# Patient Record
Sex: FEMALE | Race: WHITE | Employment: OTHER | URBAN - METROPOLITAN AREA
[De-identification: names, ages, dates, MRNs, and addresses within clinical notes are randomized per-mention and may not be internally consistent; named-entity substitution may affect disease eponyms.]

---

## 2017-02-11 ENCOUNTER — HOSPITAL ENCOUNTER (OUTPATIENT)
Age: 72
Discharge: HOME | End: 2017-02-11
Payer: MEDICARE

## 2017-02-11 LAB
-: ABNORMAL
ALBUMIN SERPL-MCNC: 4.3 G/DL (ref 3.5–5.2)
ALBUMIN/GLOBULIN RATIO: 1.5 (ref 1–2.5)
ALP BLD-CCNC: 104 U/L (ref 35–104)
ALT SERPL-CCNC: 19 U/L (ref 5–33)
AMORPHOUS: ABNORMAL
ANION GAP SERPL CALCULATED.3IONS-SCNC: 14 MMOL/L (ref 9–17)
AST SERPL-CCNC: 17 U/L
BACTERIA: ABNORMAL
BILIRUB SERPL-MCNC: 0.69 MG/DL (ref 0.3–1.2)
BILIRUBIN URINE: NEGATIVE
BUN BLDV-MCNC: 14 MG/DL (ref 8–23)
BUN/CREAT BLD: ABNORMAL (ref 9–20)
CALCIUM SERPL-MCNC: 9.4 MG/DL (ref 8.6–10.4)
CASTS UA: ABNORMAL /LPF (ref 0–2)
CHLORIDE BLD-SCNC: 101 MMOL/L (ref 98–107)
CO2: 27 MMOL/L (ref 20–31)
COLOR: ABNORMAL
COMMENT UA: ABNORMAL
CREAT SERPL-MCNC: 0.88 MG/DL (ref 0.5–0.9)
CRYSTALS, UA: ABNORMAL /HPF
EPITHELIAL CELLS UA: ABNORMAL /HPF (ref 0–5)
GFR AFRICAN AMERICAN: >60 ML/MIN
GFR NON-AFRICAN AMERICAN: >60 ML/MIN
GFR SERPL CREATININE-BSD FRML MDRD: ABNORMAL ML/MIN/{1.73_M2}
GFR SERPL CREATININE-BSD FRML MDRD: ABNORMAL ML/MIN/{1.73_M2}
GLUCOSE BLD-MCNC: 115 MG/DL (ref 70–99)
GLUCOSE URINE: NEGATIVE
HCT VFR BLD CALC: 45.8 % (ref 36–46)
HEMOGLOBIN: 15.4 G/DL (ref 12–16)
KETONES, URINE: NEGATIVE
LEUKOCYTE ESTERASE, URINE: ABNORMAL
MCH RBC QN AUTO: 30.8 PG (ref 26–34)
MCHC RBC AUTO-ENTMCNC: 33.7 G/DL (ref 31–37)
MCV RBC AUTO: 91.5 FL (ref 80–100)
MUCUS: ABNORMAL
NITRITE, URINE: NEGATIVE
OTHER OBSERVATIONS UA: ABNORMAL
PDW BLD-RTO: 13.9 % (ref 12.5–15.4)
PH UA: 6 (ref 5–8)
PLATELET # BLD: 239 K/UL (ref 140–450)
PMV BLD AUTO: 11.7 FL (ref 6–12)
POTASSIUM SERPL-SCNC: 3.9 MMOL/L (ref 3.7–5.3)
PROTEIN UA: NEGATIVE
RBC # BLD: 5.01 M/UL (ref 4–5.2)
RBC UA: ABNORMAL /HPF (ref 0–2)
RENAL EPITHELIAL, UA: ABNORMAL /HPF
SODIUM BLD-SCNC: 142 MMOL/L (ref 135–144)
SPECIFIC GRAVITY UA: 1.02 (ref 1–1.03)
TOTAL PROTEIN: 7.2 G/DL (ref 6.4–8.3)
TRICHOMONAS: ABNORMAL
TURBIDITY: ABNORMAL
URINE HGB: NEGATIVE
UROBILINOGEN, URINE: NORMAL
VITAMIN D 25-HYDROXY: 45 NG/ML (ref 30–100)
WBC # BLD: 8.5 K/UL (ref 3.5–11)
WBC UA: ABNORMAL /HPF (ref 0–5)
YEAST: ABNORMAL

## 2017-02-11 PROCEDURE — 82306 VITAMIN D 25 HYDROXY: CPT

## 2017-02-11 PROCEDURE — 80053 COMPREHEN METABOLIC PANEL: CPT

## 2017-02-11 PROCEDURE — 81001 URINALYSIS AUTO W/SCOPE: CPT

## 2017-02-11 PROCEDURE — 36415 COLL VENOUS BLD VENIPUNCTURE: CPT

## 2017-02-11 PROCEDURE — 85027 COMPLETE CBC AUTOMATED: CPT

## 2019-09-03 PROBLEM — L72.9 CYST OF SKIN: Status: ACTIVE | Noted: 2019-09-03

## 2024-02-05 ENCOUNTER — TELEPHONE (OUTPATIENT)
Dept: GYNECOLOGIC ONCOLOGY | Age: 79
End: 2024-02-05

## 2024-02-05 ENCOUNTER — OFFICE VISIT (OUTPATIENT)
Dept: GYNECOLOGIC ONCOLOGY | Age: 79
End: 2024-02-05
Payer: MEDICARE

## 2024-02-05 VITALS
WEIGHT: 153 LBS | SYSTOLIC BLOOD PRESSURE: 138 MMHG | OXYGEN SATURATION: 98 % | HEIGHT: 65 IN | HEART RATE: 73 BPM | BODY MASS INDEX: 25.49 KG/M2 | DIASTOLIC BLOOD PRESSURE: 87 MMHG

## 2024-02-05 DIAGNOSIS — R19.00 PELVIC MASS: Primary | ICD-10-CM

## 2024-02-05 DIAGNOSIS — C78.6 PERITONEAL CARCINOMATOSIS (HCC): ICD-10-CM

## 2024-02-05 DIAGNOSIS — R97.8 ELEVATED TUMOR MARKERS: ICD-10-CM

## 2024-02-05 PROCEDURE — G8399 PT W/DXA RESULTS DOCUMENT: HCPCS | Performed by: PHYSICIAN ASSISTANT

## 2024-02-05 PROCEDURE — 99205 OFFICE O/P NEW HI 60 MIN: CPT | Performed by: PHYSICIAN ASSISTANT

## 2024-02-05 PROCEDURE — G8427 DOCREV CUR MEDS BY ELIG CLIN: HCPCS | Performed by: PHYSICIAN ASSISTANT

## 2024-02-05 PROCEDURE — 1090F PRES/ABSN URINE INCON ASSESS: CPT | Performed by: PHYSICIAN ASSISTANT

## 2024-02-05 PROCEDURE — 1123F ACP DISCUSS/DSCN MKR DOCD: CPT | Performed by: PHYSICIAN ASSISTANT

## 2024-02-05 PROCEDURE — 1036F TOBACCO NON-USER: CPT | Performed by: PHYSICIAN ASSISTANT

## 2024-02-05 PROCEDURE — G8484 FLU IMMUNIZE NO ADMIN: HCPCS | Performed by: PHYSICIAN ASSISTANT

## 2024-02-05 PROCEDURE — G8419 CALC BMI OUT NRM PARAM NOF/U: HCPCS | Performed by: PHYSICIAN ASSISTANT

## 2024-02-05 ASSESSMENT — ENCOUNTER SYMPTOMS
EYES NEGATIVE: 1
RESPIRATORY NEGATIVE: 1
GASTROINTESTINAL NEGATIVE: 1

## 2024-02-05 NOTE — TELEPHONE ENCOUNTER
Called and spoke to Susan from IR to let her know a STAT order was placed for this patient. I let her know that images have been pushed to PACS. Susan stated she will give order to doctor for review.

## 2024-02-05 NOTE — PROGRESS NOTES
[facility] Gynecologic Oncology  [address], [address]  [place] [address]      I am seeing Melissa Bo for New Patient at the request of Dr. Luca Hull.    Chief Complaint:  Pelvic masses, elevated     HPI  She is a 78 y.o. female who was referred to [facility] gynecologic oncology for findings of bilateral ovarian masses and elevated .     Patient initially presented to local gynecology provider Mraie Hyatt NP at [facility] with concerns of postmenopausal bleeding.  She reported menopause in 50s she began postmenopausal bleeding with a clear/light pink vaginal discharge in summer 2023.    Pelvic ultrasound was completed which revealed endometrial stripe at 4.38 mm.  Bilateral enlarged ovaries present.  Due to her concerns of postmenopausal bleeding endometrial biopsy was obtained which revealed strips of atrophic endometrium on 1/10/2024.    OVA 1 obtained which revealed an elevated risk for postmenopausal female at 9.6.   II was elevated at 317.6 units.  CA 19-9 and CEA were within normal limits.    Patient was then referred to [facility] gynecologic oncology for further evaluation and workup.    CT abdomen pelvis on 1/26/2024 revealed multiple peritoneal nodules present within the mid abdomen measuring 1 cm.  Mild bilateral hydronephrosis.  A right pelvic mass measuring up to 15.3 x 10.6 x 9.8 cm.  Left ovary enlarged measuring 4.1 x 3.2 cm and pelvic lymphadenopathy present.  There is small amount of perisplenic and perihepatic and pelvic ascites. CT chest was negative for evidence of metastatic disease.    Discussion was held regarding treatment options including surgery versus neoadjuvant chemotherapy if malignancy is found.    Patient is scheduled for IR biopsy for tissue diagnosis on 2/7/2024 however due to insurance changes she has not transferred care to [facility] gynecologic oncology.    Today, the patient presents with her  Chris. She is a

## 2024-02-05 NOTE — PROGRESS NOTES
Review of Systems   Constitutional: Negative.    HENT:  Negative.     Eyes: Negative.    Respiratory: Negative.     Cardiovascular: Negative.    Gastrointestinal: Negative.    Endocrine: Negative.    Genitourinary:  Positive for frequency.    Musculoskeletal: Negative.    Skin: Negative.    Neurological:  Positive for dizziness (sometimes).   Hematological: Negative.

## 2024-02-06 ENCOUNTER — TELEPHONE (OUTPATIENT)
Dept: GYNECOLOGIC ONCOLOGY | Age: 79
End: 2024-02-06

## 2024-02-06 NOTE — TELEPHONE ENCOUNTER
Thank you for the update  We can see patient is scheduled for procedure next week  We will have close follow up on results

## 2024-02-06 NOTE — TELEPHONE ENCOUNTER
Patient called and inquired if she was supposed to call to schedule IR procedure, or if they will call her.  Writer informed patient that IR will call to schedule.  Patient voiced understanding and appreciation.

## 2024-02-12 ENCOUNTER — HOSPITAL ENCOUNTER (OUTPATIENT)
Dept: ULTRASOUND IMAGING | Age: 79
Discharge: HOME | End: 2024-02-14
Payer: MEDICARE

## 2024-02-12 ENCOUNTER — HOSPITAL ENCOUNTER (OUTPATIENT)
Dept: CT IMAGING | Age: 79
Discharge: HOME | End: 2024-02-14
Payer: MEDICARE

## 2024-02-12 VITALS
DIASTOLIC BLOOD PRESSURE: 78 MMHG | BODY MASS INDEX: 25.49 KG/M2 | TEMPERATURE: 97.7 F | RESPIRATION RATE: 16 BRPM | HEIGHT: 65 IN | WEIGHT: 153 LBS | SYSTOLIC BLOOD PRESSURE: 143 MMHG | HEART RATE: 60 BPM | OXYGEN SATURATION: 97 %

## 2024-02-12 VITALS
OXYGEN SATURATION: 97 % | RESPIRATION RATE: 16 BRPM | DIASTOLIC BLOOD PRESSURE: 73 MMHG | HEART RATE: 66 BPM | SYSTOLIC BLOOD PRESSURE: 137 MMHG

## 2024-02-12 DIAGNOSIS — R97.8 ELEVATED TUMOR MARKERS: ICD-10-CM

## 2024-02-12 DIAGNOSIS — C78.6 SECONDARY MALIGNANT NEOPLASM OF RETROPERITONEUM AND PERITONEUM (HCC): ICD-10-CM

## 2024-02-12 DIAGNOSIS — C78.6 PERITONEAL CARCINOMATOSIS (HCC): ICD-10-CM

## 2024-02-12 LAB
INR PPP: 1
PARTIAL THROMBOPLASTIN TIME: 24.2 SEC (ref 23–36.5)
PLATELET # BLD AUTO: 268 K/UL (ref 138–453)
PROTHROMBIN TIME: 12.9 SEC (ref 11.7–14.9)

## 2024-02-12 PROCEDURE — 88305 TISSUE EXAM BY PATHOLOGIST: CPT

## 2024-02-12 PROCEDURE — 85049 AUTOMATED PLATELET COUNT: CPT

## 2024-02-12 PROCEDURE — 2709999900 IR US GUIDED NEEDLE PLACEMENT

## 2024-02-12 PROCEDURE — 88341 IMHCHEM/IMCYTCHM EA ADD ANTB: CPT

## 2024-02-12 PROCEDURE — 7100000040 HC SPAR PHASE II RECOVERY - FIRST 15 MIN

## 2024-02-12 PROCEDURE — 99153 MOD SED SAME PHYS/QHP EA: CPT

## 2024-02-12 PROCEDURE — 88173 CYTOPATH EVAL FNA REPORT: CPT

## 2024-02-12 PROCEDURE — 2580000003 HC RX 258: Performed by: PHYSICIAN ASSISTANT

## 2024-02-12 PROCEDURE — 88172 CYTP DX EVAL FNA 1ST EA SITE: CPT

## 2024-02-12 PROCEDURE — 88342 IMHCHEM/IMCYTCHM 1ST ANTB: CPT

## 2024-02-12 PROCEDURE — 99152 MOD SED SAME PHYS/QHP 5/>YRS: CPT

## 2024-02-12 PROCEDURE — 88177 CYTP FNA EVAL EA ADDL: CPT

## 2024-02-12 PROCEDURE — 10005 FNA BX W/US GDN 1ST LES: CPT

## 2024-02-12 PROCEDURE — 6360000002 HC RX W HCPCS: Performed by: RADIOLOGY

## 2024-02-12 PROCEDURE — 85610 PROTHROMBIN TIME: CPT

## 2024-02-12 PROCEDURE — 7100000041 HC SPAR PHASE II RECOVERY - ADDTL 15 MIN

## 2024-02-12 PROCEDURE — 85730 THROMBOPLASTIN TIME PARTIAL: CPT

## 2024-02-12 RX ORDER — SODIUM CHLORIDE 9 MG/ML
INJECTION, SOLUTION INTRAVENOUS CONTINUOUS
Status: DISCONTINUED | OUTPATIENT
Start: 2024-02-12 | End: 2024-02-15 | Stop reason: HOSPADM

## 2024-02-12 RX ORDER — FENTANYL CITRATE 50 UG/ML
INJECTION, SOLUTION INTRAMUSCULAR; INTRAVENOUS PRN
Status: COMPLETED | OUTPATIENT
Start: 2024-02-12 | End: 2024-02-12

## 2024-02-12 RX ORDER — MIDAZOLAM HYDROCHLORIDE 2 MG/2ML
INJECTION, SOLUTION INTRAMUSCULAR; INTRAVENOUS PRN
Status: COMPLETED | OUTPATIENT
Start: 2024-02-12 | End: 2024-02-12

## 2024-02-12 RX ADMIN — FENTANYL CITRATE 50 MCG: 50 INJECTION, SOLUTION INTRAMUSCULAR; INTRAVENOUS at 15:11

## 2024-02-12 RX ADMIN — FENTANYL CITRATE 50 MCG: 50 INJECTION, SOLUTION INTRAMUSCULAR; INTRAVENOUS at 15:00

## 2024-02-12 RX ADMIN — SODIUM CHLORIDE: 9 INJECTION, SOLUTION INTRAVENOUS at 13:54

## 2024-02-12 RX ADMIN — MIDAZOLAM HYDROCHLORIDE 0.5 MG: 1 INJECTION, SOLUTION INTRAMUSCULAR; INTRAVENOUS at 15:14

## 2024-02-12 NOTE — DISCHARGE INSTRUCTIONS
General Radiology Post Procedure Instructions    ACTIVITY:   _____Do not drive or operate heavy machinery or make legal decisions until morning.   _____Rest quietly for 24 hours   _____No straining, heavy lifting, or strenuous activity for 24 hours   _____Other        DIET:   _____resume previous diet   _____Start with light meal and advance as tolerated.   _____Drink extra fluids today.  No alcoholic Beverages   _____Other       MEDICATION:   _____Resume medications as before   _____No Aspirin or aspirin containing products for 3 days             Ask your doctor about resuming coumadin, Plavix or other blood thinners.   _____Regular strength Tylenol as directed for discomfort.    WOUND CARE:   _____Remove bandage tomorrow   _____May take a shower or bath tomorrow   _____Keep wound clean and dry   _____Other     NOTIFY PHYSICIAN IF:   _____Temperature over 100 degrees   _____Unusual pain, swelling, redness, drainage, odor at puncture site.   _____Persistent shoulder pain   _____Chest pain   _____Difficulty breathing   _____Coughing up blood    _____If you are not passing as much urine as normal   _____other       WHO TO CALL: If you have any questions, concerns or problems in the 24 hours post procedure call [phone] and ask for [employer] on call.   If a serious situation arises call 911 or your local emergency number.    ADDITIONAL INSTRUCTIONS:        BRING THIS INSTRUCTION SHEET WITH YOU IF YOU NEED TO VISIT AN EMERGENCY ROOM OR SEE YOUR DOCTOR WITHIN 24 HOURS

## 2024-02-12 NOTE — H&P
file for drug use.  Marital Status   Occupation , retired, [employer]    Review of Systems:  CONSTITUTIONAL:   negative for fevers, chills, fatigue and malaise    EYES:   negative for double vision, blurred vision and photophobia    HEENT:   negative for tinnitus, epistaxis and sore throat     RESPIRATORY:   negative for cough, shortness of breath, wheezing     CARDIOVASCULAR:   negative for chest pain, palpitations, syncope, edema     GASTROINTESTINAL:   negative for nausea, vomiting     GENITOURINARY/RENAL:   negative for incontinence     MUSCULOSKELETAL:   negative for neck or back pain     NEUROLOGICAL:   Negative for weakness and tingling  negative for headaches and dizziness     PSYCHIATRIC:   negative for anxiety         OBJECTIVE:   VITALS:  height is 1.651 m (5' 5\") and weight is 69.4 kg (153 lb). Her temporal temperature is 97.7 °F (36.5 °C). Her blood pressure is 150/72 (abnormal) and her pulse is 61. Her respiration is 16 and oxygen saturation is 98%.   CONSTITUTIONAL:alert & cooperative, no acute distress.  Pleasant and talkative.  Present with .  SKIN:  Brief inspection of skin, warm and dry, no rashes on exposed areas of skin.  HEAD:  Normocephalic, atraumatic.  EYES: EOMs intact.   EARS:  Hearing loss.  NOSE:  Nares patent.  No rhinorrhea.  MOUTH/THROAT:  benign  NECK:good ROM.  LUNGS: Clear to auscultation bilaterally, no wheezes.  CARDIOVASCULAR: regular rhythm, occasional irregular beat noted.  ABDOMEN: non distended.  EXTREMITIES: no edema bilateral lower extremities.    IMPRESSIONS:   Peritoneal carcinomatosis   has a past medical history of Hearing loss, Hypercholesteremia, Hypertension, Osteoarthritis, PAC (premature atrial contraction), Pelvic mass, PVC's (premature ventricular contractions), and Scoliosis.   PLANS:   biopsy    JOSE WINSLOW PA-C  Electronically signed 2/12/2024 at 2:01 PM

## 2024-02-12 NOTE — PRE SEDATION
Sedation Pre-Procedure Note    Patient Name: Melissa Bo   YOB: 1945  Room/Bed: Room/bed info not found  Medical Record Number: [id number]  Date: 2/12/2024   Time: 2:29 PM       Indication:  pelvic mass and omental nodules    Consent: I have discussed with the patient and/or the patient representative the indication, alternatives, and the possible risks and/or complications of the planned procedure and the anesthesia methods. The patient and/or patient representative appear to understand and agree to proceed.    Vital Signs:   Vitals:    02/12/24 1351   BP: (!) 150/72   Pulse: 61   Resp: 16   Temp: 97.7 °F (36.5 °C)   SpO2: 98%       Past Medical History:   has a past medical history of Hearing loss, Hypercholesteremia, Hypertension, Osteoarthritis, PAC (premature atrial contraction), Pelvic mass, PVC's (premature ventricular contractions), and Scoliosis.    Past Surgical History:   has a past surgical history that includes Total hip arthroplasty.    Medications:   Scheduled Meds:   Continuous Infusions:    sodium chloride 20 mL/hr at 02/12/24 1354     PRN Meds:   Home Meds:   Prior to Admission medications    Medication Sig Start Date End Date Taking? Authorizing Provider   atorvastatin (LIPITOR) 20 MG tablet TAKE 1 TABLET BY MOUTH ONE TIME A DAY 6/3/19   Adrián Cano MD   DILT- MG extended release capsule TAKE 1 CAPSULE BY MOUTH ONE TIME A DAY 8/25/19   Adrián Cano MD   metoprolol succinate (TOPROL XL) 100 MG extended release tablet TAKE 1 TABLET BY MOUTH ONE TIME A DAY 7/29/19   Adrián Cano MD   aspirin 81 MG tablet Take 1 tablet by mouth daily    Adrián Cano MD   calcium-vitamin D (OSCAL-500) 500-200 MG-UNIT per tablet Take 1 tablet by mouth daily    Adrián Cano MD     Coumadin Use Last 7 Days:  no  Antiplatelet drug therapy use last 7 days: no  Other anticoagulant use last 7 days: no  Additional Medication Information:  see med

## 2024-02-12 NOTE — BRIEF OP NOTE
Brief Postoperative Note    Melissa Bo  YOB: 1945  [id number]    Pre-operative Diagnosis: Pelvic masses and peritoneal nodules    Post-operative Diagnosis: Same    Procedure: US guided FNA of left amdominal omental/peritoneal nodule    Anesthesia: Moderate Sedation    Surgeons/Assistants: Don    Estimated Blood Loss: less than 50     Complications: None    Specimens: Was Obtained: 4 FNA samples using 20 G needle    Electronically signed by Jono Ochoa MD on 2/12/2024 at 3:58 PM

## 2024-02-12 NOTE — POST SEDATION
Sedation Post Procedure Note    Patient Name: Melissa Bo   YOB: 1945  Room/Bed: Room/bed info not found  Medical Record Number: [id number]  Date: 2/12/2024   Time: 3:57 PM         Physicians/Assistants: Jono Ochoa MD, MD    Procedure Performed:  US guided FNA of left amdominal omental/peritoneal nodule    Post-Sedation Vital Signs:  Vitals:    02/12/24 1351   BP: (!) 150/72   Pulse: 61   Resp: 16   Temp: 97.7 °F (36.5 °C)   SpO2: 98%      Vital signs were reviewed and were stable after the procedure (see flow sheet for vitals)            Complications: none    Electronically signed by Jono Ochoa MD on 2/12/2024 at 3:57 PM

## 2024-02-12 NOTE — H&P (VIEW-ONLY)
History and Physical    Pt Name: Melissa Bo  MRN: [id number]  YOB: 1945  Date of evaluation: 2/12/2024    SUBJECTIVE:   History of Chief Complaint:    Patient presents preprocedure for peritoneal biopsy.  She says that she has been told that she has lymphadenopathy in the pelvis, says that she is thought to have peritoneal carcinomatosis.  She had been experiencing increased vaginal discharge, sought evaluation with gynecology.  She has had imaging, was diagnosed with the above, has elevated ..  She has been scheduled for biopsy today.  Past Medical History    has a past medical history of Hearing loss, Hypercholesteremia, Hypertension, Osteoarthritis, PAC (premature atrial contraction), Pelvic mass, PVC's (premature ventricular contractions), and Scoliosis.  Patient follows with Dr. Ovalle for her cardiology care.  Past Surgical History   has a past surgical history that includes Total hip arthroplasty.  Endometrial biopsy  Medications  Prior to Admission medications    Medication Sig Start Date End Date Taking? Authorizing Provider   atorvastatin (LIPITOR) 20 MG tablet TAKE 1 TABLET BY MOUTH ONE TIME A DAY 6/3/19   Adrián Cano MD   DILT- MG extended release capsule TAKE 1 CAPSULE BY MOUTH ONE TIME A DAY 8/25/19   Adrián Cano MD   metoprolol succinate (TOPROL XL) 100 MG extended release tablet TAKE 1 TABLET BY MOUTH ONE TIME A DAY 7/29/19   Adrián Cano MD   aspirin 81 MG tablet Take 1 tablet by mouth daily    Adrián Cano MD   calcium-vitamin D (OSCAL-500) 500-200 MG-UNIT per tablet Take 1 tablet by mouth daily    Adrián Cano MD     Allergies  has No Known Allergies.  Family History  family history includes Diabetes type 2  in her father; Peripheral Vascular Disease in her mother.  Social History   reports that she has quit smoking. She has never used smokeless tobacco.   has no history on file for alcohol use.   has no history on  L parietal scalp lac - tripped and fell striking head on corner of baseboard at 11am today. No active bleeding at this time. No LOC or vomiting.

## 2024-02-16 ENCOUNTER — HOSPITAL ENCOUNTER (OUTPATIENT)
Age: 79
Setting detail: SPECIMEN
Discharge: HOME | End: 2024-02-16

## 2024-02-16 ENCOUNTER — OFFICE VISIT (OUTPATIENT)
Dept: GYNECOLOGIC ONCOLOGY | Age: 79
End: 2024-02-16
Payer: MEDICARE

## 2024-02-16 ENCOUNTER — TELEPHONE (OUTPATIENT)
Dept: ONCOLOGY | Age: 79
End: 2024-02-16

## 2024-02-16 ENCOUNTER — TELEPHONE (OUTPATIENT)
Dept: GYNECOLOGIC ONCOLOGY | Age: 79
End: 2024-02-16

## 2024-02-16 VITALS
TEMPERATURE: 97.5 F | BODY MASS INDEX: 25.63 KG/M2 | DIASTOLIC BLOOD PRESSURE: 77 MMHG | WEIGHT: 154 LBS | OXYGEN SATURATION: 98 % | HEART RATE: 78 BPM | SYSTOLIC BLOOD PRESSURE: 137 MMHG

## 2024-02-16 DIAGNOSIS — R97.1 ELEVATED CANCER ANTIGEN 125 (CA 125): ICD-10-CM

## 2024-02-16 DIAGNOSIS — R19.00 PELVIC MASS: ICD-10-CM

## 2024-02-16 DIAGNOSIS — C57.9 MALIGNANT NEOPLASM OF FEMALE GENITAL ORGAN (HCC): ICD-10-CM

## 2024-02-16 DIAGNOSIS — N95.0 POSTMENOPAUSAL BLEEDING: ICD-10-CM

## 2024-02-16 DIAGNOSIS — C78.6 PERITONEAL CARCINOMATOSIS (HCC): Primary | ICD-10-CM

## 2024-02-16 DIAGNOSIS — R10.2 PELVIC PRESSURE IN FEMALE: ICD-10-CM

## 2024-02-16 LAB — CANCER AG125 SERPL-ACNC: 388 U/ML

## 2024-02-16 PROCEDURE — G2212 PROLONG OUTPT/OFFICE VIS: HCPCS | Performed by: OBSTETRICS & GYNECOLOGY

## 2024-02-16 PROCEDURE — 1123F ACP DISCUSS/DSCN MKR DOCD: CPT | Performed by: OBSTETRICS & GYNECOLOGY

## 2024-02-16 PROCEDURE — 99205 OFFICE O/P NEW HI 60 MIN: CPT | Performed by: OBSTETRICS & GYNECOLOGY

## 2024-02-16 PROCEDURE — 3078F DIAST BP <80 MM HG: CPT | Performed by: OBSTETRICS & GYNECOLOGY

## 2024-02-16 PROCEDURE — 3075F SYST BP GE 130 - 139MM HG: CPT | Performed by: OBSTETRICS & GYNECOLOGY

## 2024-02-16 NOTE — PROGRESS NOTES
[facility] Gynecologic Oncology  [address]  [place] [address]    GYNECOLOGIC ONCOLOGY   FOLLOW UP NOTE    PATIENT NAME:  Melissa Bo  MRN:                     [id number]  DATE:                    02/16/2024    REASON FOR VISIT:     New patient visit  Post-menopausal bleeding  Pelvic mass  Elevated -LZ    HISTORY OF PRESENT ILLNESS:     Melissa Bo is an 79yo, with symptoms of pelvic pressure and post-menopausal bleeding. CT-scan AP demonstrates 3cm deep liver lesion, peritoneal carcinomatosis (largest peritoneal nodule 1cm), 15cm pelvic mass, small amount ascites (01/26/24). -NV (01/10/24): 318 (H). Pathology results from the FNA of the omental / peritoneal nodule  is pending at the time of this visit (02/12/24). She presents to the office today, accompanied  (Chris, 89yo) and daughter (Natalia).    The patient reports to feel well. She reports to have symptoms of intermittent vaginal bleeding.     She reports to tolerate a regular diet, with symptoms of anorexia and abdominal bloating. She denies to have any symptoms of nausea. Towards the end of the visit, after the pelvic exam, the patient and her daughter discussed possible symptoms of rectal bleeding, with passing blood clots.         She denies to have any abdominal or pelvic pain symptoms. She denies taking any chronic analgesic medications. She denies to have any symptoms of fatigue. She denies to have any fevers or chills.    TREATMENT SUMMARY:  THESE RECORDS HAVE BEEN REVIEWED, UNLESS OTHERWISE INDICATED     Pelvic mass and Elevated -SO (Diagnosed 2024)  The patient reports to have experienced symptoms of post-menopausal bleeding    DIAGNOSTIC STUDIES:  IMAGING EVALUATION:  Office Pelvic Ultrasound (01/10/24)  7.04x3.23cm uterus, with 0.435cm endometrial thickness  3.43x4.17x3.84cm LEFT ovary  5.52x6.5x5.44cm RIGHT ovary, with 3.5x2.8x3.3cm simple cyst and several solid masses with some

## 2024-02-16 NOTE — PROGRESS NOTES
Review of Systems   Constitutional:  Positive for appetite change (decreased appetite). Negative for chills, diaphoresis, fatigue, fever and unexpected weight change.   HENT:   Negative for hearing loss, lump/mass, mouth sores, nosebleeds, sore throat, tinnitus, trouble swallowing and voice change.    Eyes:  Negative for eye problems and icterus.   Respiratory:  Negative for chest tightness, cough, hemoptysis, shortness of breath and wheezing.    Cardiovascular:  Negative for chest pain, leg swelling and palpitations.   Gastrointestinal:  Positive for abdominal distention (bloating) and blood in stool (bright red blood / hemorrhoids). Negative for abdominal pain, constipation, diarrhea, nausea, rectal pain and vomiting.   Endocrine: Negative for hot flashes.   Genitourinary:  Positive for frequency, vaginal bleeding (last time about a week ago) and vaginal discharge (pale discharge). Negative for bladder incontinence, difficulty urinating, dyspareunia, dysuria, hematuria, menstrual problem, nocturia and pelvic pain.    Musculoskeletal:  Negative for arthralgias, back pain, flank pain, gait problem, myalgias, neck pain and neck stiffness.   Skin:  Negative for itching, rash and wound.   Neurological:  Positive for dizziness (with standing sometimes) and light-headedness (sometimes). Negative for extremity weakness, gait problem, headaches, numbness, seizures and speech difficulty.   Hematological:  Negative for adenopathy. Does not bruise/bleed easily.   Psychiatric/Behavioral:  Positive for depression. Negative for confusion, decreased concentration, sleep disturbance and suicidal ideas. The patient is nervous/anxious.

## 2024-02-16 NOTE — TELEPHONE ENCOUNTER
Called and informed patient that surgeon has surgery on 2/19/24 and appt will need to be rescheduled.  Informed patient that appt will not be scheduled until we have pathology results back.  Assured patient that writer is monitoring case and will ensure appt's required will be made when appropriate.  Patient voiced understanding and appreciation.

## 2024-02-16 NOTE — TELEPHONE ENCOUNTER
Lucille, Dr. Patrick's nurse, called in to alert writer to pt's case & request oncology navigation.  Lucille stated await bx results & may contact pt 2/19.

## 2024-02-19 ENCOUNTER — TELEPHONE (OUTPATIENT)
Dept: ONCOLOGY | Age: 79
End: 2024-02-19

## 2024-02-19 ENCOUNTER — TELEPHONE (OUTPATIENT)
Dept: GYNECOLOGIC ONCOLOGY | Age: 79
End: 2024-02-19

## 2024-02-19 NOTE — TELEPHONE ENCOUNTER
Daughter called and requested clarification on appointments and referrals.  Writer clarified questions.  Daughter voiced understanding and appreciation.

## 2024-02-19 NOTE — TELEPHONE ENCOUNTER
Name: Melissa Bo  : 1945  MRN: [id number]    Oncology Navigation- Initial Note:    Intake-  Contact Type: Telephone    Continuum of Care: Diagnosis/Active Treatment    Smoking hx: Former, quit 40+ years ago.    Notes: Introductory phone call made to patient, instructed patient writer navigating oncology care & may call writer with any questions/concerns @ [phone] prn.  Discussed potential barriers to care, pt denied any.  Inquired on alcohol/drug use, pt stated occasionally drinks wine & denied drug use.  Pt stated awaits bx results & Dr. Patrick f/u to be scheduled once results available.  Pt inquired on holistic care & stated recently found [address].  Notified pt may incorporate holistic tx w/standard of care tx.  Encouraged pt to discuss w/Dr. Patrick.  Pt verbalized understanding & denied questions/concerns.  Instructed pt writer will follow closely & encouraged to contact writer prn.  [facility] written materials along w/writer's business card mailed to patient.  Will continue to follow.     Electronically signed by Kim Rainey RN on 2024 at 10:30 AM

## 2024-02-20 ENCOUNTER — TELEPHONE (OUTPATIENT)
Dept: GYNECOLOGIC ONCOLOGY | Age: 79
End: 2024-02-20

## 2024-02-20 NOTE — TELEPHONE ENCOUNTER
Called pathology to inquire on surgical patho results from IR bx.  Was informed by Marii that patho results were signed out on 2/15/24.  Writer informed Marii that results not available in EPIC.  Writer informed that sometimes if order isn't released correctly that results are visible.  Writer notified PA and supervisor of results / conversation.

## 2024-02-21 ENCOUNTER — TELEPHONE (OUTPATIENT)
Dept: GYNECOLOGIC ONCOLOGY | Age: 79
End: 2024-02-21

## 2024-02-21 ENCOUNTER — TELEPHONE (OUTPATIENT)
Dept: INTERVENTIONAL RADIOLOGY/VASCULAR | Age: 79
End: 2024-02-21

## 2024-02-21 ENCOUNTER — TELEMEDICINE (OUTPATIENT)
Dept: GYNECOLOGIC ONCOLOGY | Age: 79
End: 2024-02-21
Payer: MEDICARE

## 2024-02-21 DIAGNOSIS — R41.3 MEMORY LOSS: ICD-10-CM

## 2024-02-21 DIAGNOSIS — C57.9 MALIGNANT NEOPLASM OF FEMALE GENITAL ORGAN (HCC): Primary | ICD-10-CM

## 2024-02-21 DIAGNOSIS — K62.5 RECTAL BLEEDING: ICD-10-CM

## 2024-02-21 DIAGNOSIS — R19.00 PELVIC MASS: ICD-10-CM

## 2024-02-21 DIAGNOSIS — R97.1 ELEVATED CANCER ANTIGEN 125 (CA 125): ICD-10-CM

## 2024-02-21 DIAGNOSIS — R10.2 PELVIC PRESSURE IN FEMALE: ICD-10-CM

## 2024-02-21 DIAGNOSIS — N95.0 POSTMENOPAUSAL BLEEDING: ICD-10-CM

## 2024-02-21 PROCEDURE — 99215 OFFICE O/P EST HI 40 MIN: CPT | Performed by: OBSTETRICS & GYNECOLOGY

## 2024-02-21 PROCEDURE — 1123F ACP DISCUSS/DSCN MKR DOCD: CPT | Performed by: OBSTETRICS & GYNECOLOGY

## 2024-02-23 ENCOUNTER — TELEPHONE (OUTPATIENT)
Dept: ONCOLOGY | Age: 79
End: 2024-02-23

## 2024-02-23 ENCOUNTER — INITIAL CONSULT (OUTPATIENT)
Dept: ONCOLOGY | Age: 79
End: 2024-02-23
Payer: MEDICARE

## 2024-02-23 VITALS
HEART RATE: 68 BPM | HEIGHT: 65 IN | DIASTOLIC BLOOD PRESSURE: 77 MMHG | BODY MASS INDEX: 25.34 KG/M2 | SYSTOLIC BLOOD PRESSURE: 145 MMHG | WEIGHT: 152.1 LBS | TEMPERATURE: 97.2 F

## 2024-02-23 DIAGNOSIS — C56.1 MALIGNANT NEOPLASM OF RIGHT OVARY (HCC): Primary | ICD-10-CM

## 2024-02-23 DIAGNOSIS — C48.2 CANCER OF PERITONEUM (HCC): ICD-10-CM

## 2024-02-23 PROCEDURE — 99205 OFFICE O/P NEW HI 60 MIN: CPT | Performed by: INTERNAL MEDICINE

## 2024-02-23 PROCEDURE — 1123F ACP DISCUSS/DSCN MKR DOCD: CPT | Performed by: INTERNAL MEDICINE

## 2024-02-23 NOTE — TELEPHONE ENCOUNTER
Name: Melissa Bo  : 1945  MRN: [id number]    Oncology Navigation Follow-Up Note    Contact Type:  Medical Oncology    Notes: Pt @ [facility] for Dr. Brown consultation.  Met w/pt & family prior to appt.  Pt denied questions/concerns for writer.  Encouraged to contact writer prn.  Will continue to follow.      Electronically signed by Kim Rainey RN on 2024 at 10:42 AM

## 2024-02-23 NOTE — TELEPHONE ENCOUNTER
cHemo orders placed  Rv in with c 1 d 1       AVS given to  (JB) to follow precert and coordinate rv    PT was given AVS and appt schedule    Electronically signed by Sarah El on 2/23/2024 at 9:51 AM

## 2024-02-28 ENCOUNTER — HOSPITAL ENCOUNTER (OUTPATIENT)
Dept: INTERVENTIONAL RADIOLOGY/VASCULAR | Age: 79
Discharge: HOME | End: 2024-03-01
Payer: MEDICARE

## 2024-02-28 VITALS
RESPIRATION RATE: 16 BRPM | TEMPERATURE: 97.9 F | SYSTOLIC BLOOD PRESSURE: 114 MMHG | OXYGEN SATURATION: 99 % | HEART RATE: 63 BPM | DIASTOLIC BLOOD PRESSURE: 85 MMHG

## 2024-02-28 DIAGNOSIS — C57.9 MALIGNANT NEOPLASM OF FEMALE GENITAL ORGAN (HCC): ICD-10-CM

## 2024-02-28 PROCEDURE — C1788 PORT, INDWELLING, IMP: HCPCS

## 2024-02-28 PROCEDURE — 76937 US GUIDE VASCULAR ACCESS: CPT

## 2024-02-28 PROCEDURE — 6360000002 HC RX W HCPCS: Performed by: PHYSICIAN ASSISTANT

## 2024-02-28 PROCEDURE — 6360000002 HC RX W HCPCS: Performed by: RADIOLOGY

## 2024-02-28 PROCEDURE — 36561 INSERT TUNNELED CV CATH: CPT

## 2024-02-28 PROCEDURE — 77001 FLUOROGUIDE FOR VEIN DEVICE: CPT

## 2024-02-28 RX ORDER — SODIUM CHLORIDE 9 MG/ML
INJECTION, SOLUTION INTRAVENOUS CONTINUOUS
Status: DISCONTINUED | OUTPATIENT
Start: 2024-02-28 | End: 2024-03-02 | Stop reason: HOSPADM

## 2024-02-28 RX ORDER — ACETAMINOPHEN 325 MG/1
650 TABLET ORAL EVERY 4 HOURS PRN
Status: DISCONTINUED | OUTPATIENT
Start: 2024-02-28 | End: 2024-03-02 | Stop reason: HOSPADM

## 2024-02-28 RX ORDER — HEPARIN SODIUM (PORCINE) LOCK FLUSH IV SOLN 100 UNIT/ML 100 UNIT/ML
SOLUTION INTRAVENOUS PRN
Status: COMPLETED | OUTPATIENT
Start: 2024-02-28 | End: 2024-02-28

## 2024-02-28 RX ADMIN — Medication 1000 MG: at 11:33

## 2024-02-28 RX ADMIN — Medication 1000 MG: at 10:44

## 2024-02-28 RX ADMIN — HEPARIN SODIUM (PORCINE) LOCK FLUSH IV SOLN 100 UNIT/ML 500 UNITS: 100 SOLUTION at 11:47

## 2024-02-28 ASSESSMENT — PAIN SCALES - GENERAL: PAINLEVEL_OUTOF10: 0

## 2024-02-28 ASSESSMENT — PAIN - FUNCTIONAL ASSESSMENT: PAIN_FUNCTIONAL_ASSESSMENT: 0-10

## 2024-02-28 NOTE — INTERVAL H&P NOTE
Pt Name: Melissa Bo  MRN: [id number]  YOB: 1945  Date of evaluation: 2/28/2024    I have reviewed the patient's history and physical examination completed on 2/12/2024    No changes to history or on examination today, unless noted below.   HR continues to be irregular, history of PVC's, follows with cardiology. Otherwise no changes     KAREN Moreno - CNP  2/28/24  10:48 AM

## 2024-02-28 NOTE — BRIEF OP NOTE
Brief Postoperative Note for Port Placement    Melissa Bo  YOB: 1945  [id number]    Pre-operative Diagnosis: Cancer of peritoneum      Post-operative Diagnosis: Same    Procedure: 8 Fr Port Placement    Medication Given: none    Anesthesia: 1 %Lidocaine, 1% Lidocaine w/ Epi    Surgeons/Assistants: MD Manish and CHUCK Carpio    Estimated Blood Loss: Minimal    Complications: none    Specimen Removal: None    8 Fr Port placement into the Right IJ. Port flushed with heparin.  Incision site closed with Vicryl sutures and tissue adhesive. Vital signs were reviewed and were stable after the procedure. Patient tolerated the procedure well.    Electronically signed by CHUCK Carpio PA-C on 2/28/2024 at 12:17 PM

## 2024-02-29 ENCOUNTER — TELEPHONE (OUTPATIENT)
Dept: INFUSION THERAPY | Age: 79
End: 2024-02-29

## 2024-02-29 ENCOUNTER — TELEPHONE (OUTPATIENT)
Dept: ONCOLOGY | Age: 79
End: 2024-02-29

## 2024-02-29 DIAGNOSIS — C48.2 CANCER OF PERITONEUM (HCC): Primary | ICD-10-CM

## 2024-02-29 RX ORDER — DEXAMETHASONE 4 MG/1
20 TABLET ORAL SEE ADMIN INSTRUCTIONS
Qty: 40 TABLET | Refills: 0 | Status: SHIPPED | OUTPATIENT
Start: 2024-02-29 | End: 2024-03-06 | Stop reason: SDUPTHER

## 2024-02-29 NOTE — TELEPHONE ENCOUNTER
Name: Melissa Bo  : 1945  MRN: [id number]    Oncology Navigation Follow-Up Note    Contact Type:  Telephone    Notes: Pt called in stating completed port placement yesterday & inquired on scheduling chemotherapy.  Instructed pt writer will contact [facility] , now & call back w/update.  Deb updated on conversation w/pt.  Deb stated chemotherapy PA approved this am & will contact pt to schedule.  Attempted to contact pt, no answer, VM left updated on conversation w/Deb.  \"Who to Call When\" document mailed to pt.  Will continue to follow.      Electronically signed by Kim Rainey RN on 2024 at 10:24 AM

## 2024-02-29 NOTE — TELEPHONE ENCOUNTER
Chemotherapy orders received:    Ht=65 inches  Tn=933 lbs  BSA=1.77  Chemotherapy doses verified:   Taxol - no dose selected   Carboplatin - no dose selected   Sarah Steven, RN     Routed tx plan to Dr to select dose  Called and spoke to pt about rx for dex, she verbalized agreement

## 2024-03-05 NOTE — TELEPHONE ENCOUNTER
Chemotherapy orders received:     Ht=65 inches  Os=711 lbs  BSA=1.77  Chemotherapy doses verified:   Taxol - no dose selected   Carboplatin - no dose selected   Katia Juarez RN

## 2024-03-06 ENCOUNTER — OFFICE VISIT (OUTPATIENT)
Dept: ONCOLOGY | Age: 79
End: 2024-03-06
Payer: MEDICARE

## 2024-03-06 ENCOUNTER — TELEPHONE (OUTPATIENT)
Dept: ONCOLOGY | Age: 79
End: 2024-03-06

## 2024-03-06 ENCOUNTER — HOSPITAL ENCOUNTER (OUTPATIENT)
Dept: INFUSION THERAPY | Age: 79
Discharge: HOME | End: 2024-03-06
Payer: MEDICARE

## 2024-03-06 VITALS
SYSTOLIC BLOOD PRESSURE: 136 MMHG | DIASTOLIC BLOOD PRESSURE: 79 MMHG | BODY MASS INDEX: 25.28 KG/M2 | OXYGEN SATURATION: 96 % | HEART RATE: 91 BPM | TEMPERATURE: 97.6 F | WEIGHT: 151.9 LBS

## 2024-03-06 DIAGNOSIS — C48.2 CANCER OF PERITONEUM (HCC): Primary | ICD-10-CM

## 2024-03-06 DIAGNOSIS — Z51.11 CHEMOTHERAPY MANAGEMENT, ENCOUNTER FOR: ICD-10-CM

## 2024-03-06 DIAGNOSIS — C56.1 MALIGNANT NEOPLASM OF RIGHT OVARY (HCC): Primary | ICD-10-CM

## 2024-03-06 LAB
ALBUMIN SERPL-MCNC: 4.1 G/DL (ref 3.5–5.2)
ALBUMIN/GLOB SERPL: 1.5 {RATIO} (ref 1–2.5)
ALP SERPL-CCNC: 124 U/L (ref 35–104)
ALT SERPL-CCNC: 18 U/L (ref 5–33)
ANION GAP SERPL CALCULATED.3IONS-SCNC: 12 MMOL/L (ref 9–17)
AST SERPL-CCNC: 20 U/L
BASOPHILS # BLD: 0 K/UL (ref 0–0.2)
BASOPHILS NFR BLD: 1 % (ref 0–2)
BILIRUB SERPL-MCNC: 0.5 MG/DL (ref 0.3–1.2)
BUN SERPL-MCNC: 19 MG/DL (ref 8–23)
CALCIUM SERPL-MCNC: 9.7 MG/DL (ref 8.6–10.4)
CHLORIDE SERPL-SCNC: 106 MMOL/L (ref 98–107)
CO2 SERPL-SCNC: 23 MMOL/L (ref 20–31)
CREAT SERPL-MCNC: 0.9 MG/DL (ref 0.5–0.9)
EOSINOPHIL # BLD: 0 K/UL (ref 0–0.4)
EOSINOPHILS RELATIVE PERCENT: 0 % (ref 1–4)
ERYTHROCYTE [DISTWIDTH] IN BLOOD BY AUTOMATED COUNT: 13.2 % (ref 12.5–15.4)
GFR SERPL CREATININE-BSD FRML MDRD: >60 ML/MIN/1.73M2
GLUCOSE SERPL-MCNC: 169 MG/DL (ref 70–99)
HCT VFR BLD AUTO: 41.1 % (ref 36–46)
HGB BLD-MCNC: 14 G/DL (ref 12–16)
LYMPHOCYTES NFR BLD: 1 K/UL (ref 1–4.8)
LYMPHOCYTES RELATIVE PERCENT: 11 % (ref 24–44)
MCH RBC QN AUTO: 31.4 PG (ref 26–34)
MCHC RBC AUTO-ENTMCNC: 34.1 G/DL (ref 31–37)
MCV RBC AUTO: 92.2 FL (ref 80–100)
MONOCYTES NFR BLD: 0.1 K/UL (ref 0.1–1.2)
MONOCYTES NFR BLD: 1 % (ref 2–11)
NEUTROPHILS NFR BLD: 87 % (ref 36–66)
NEUTS SEG NFR BLD: 7.6 K/UL (ref 1.8–7.7)
PLATELET # BLD AUTO: 229 K/UL (ref 140–450)
PMV BLD AUTO: 10.2 FL (ref 6–12)
POTASSIUM SERPL-SCNC: 4.4 MMOL/L (ref 3.7–5.3)
PROT SERPL-MCNC: 6.8 G/DL (ref 6.4–8.3)
RBC # BLD AUTO: 4.46 M/UL (ref 4–5.2)
SODIUM SERPL-SCNC: 141 MMOL/L (ref 135–144)
WBC OTHER # BLD: 8.7 K/UL (ref 3.5–11)

## 2024-03-06 PROCEDURE — 99211 OFF/OP EST MAY X REQ PHY/QHP: CPT | Performed by: INTERNAL MEDICINE

## 2024-03-06 PROCEDURE — 6360000002 HC RX W HCPCS: Performed by: INTERNAL MEDICINE

## 2024-03-06 PROCEDURE — 96413 CHEMO IV INFUSION 1 HR: CPT

## 2024-03-06 PROCEDURE — 80053 COMPREHEN METABOLIC PANEL: CPT

## 2024-03-06 PROCEDURE — 96367 TX/PROPH/DG ADDL SEQ IV INF: CPT

## 2024-03-06 PROCEDURE — 2500000003 HC RX 250 WO HCPCS: Performed by: INTERNAL MEDICINE

## 2024-03-06 PROCEDURE — 96375 TX/PRO/DX INJ NEW DRUG ADDON: CPT

## 2024-03-06 PROCEDURE — 99213 OFFICE O/P EST LOW 20 MIN: CPT | Performed by: INTERNAL MEDICINE

## 2024-03-06 PROCEDURE — 36415 COLL VENOUS BLD VENIPUNCTURE: CPT

## 2024-03-06 PROCEDURE — 96415 CHEMO IV INFUSION ADDL HR: CPT

## 2024-03-06 PROCEDURE — 1123F ACP DISCUSS/DSCN MKR DOCD: CPT | Performed by: INTERNAL MEDICINE

## 2024-03-06 PROCEDURE — 36591 DRAW BLOOD OFF VENOUS DEVICE: CPT

## 2024-03-06 PROCEDURE — 85025 COMPLETE CBC W/AUTO DIFF WBC: CPT

## 2024-03-06 PROCEDURE — 2580000003 HC RX 258: Performed by: INTERNAL MEDICINE

## 2024-03-06 PROCEDURE — 96417 CHEMO IV INFUS EACH ADDL SEQ: CPT

## 2024-03-06 RX ORDER — SODIUM CHLORIDE 0.9 % (FLUSH) 0.9 %
5-40 SYRINGE (ML) INJECTION PRN
Status: CANCELLED | OUTPATIENT
Start: 2024-03-06

## 2024-03-06 RX ORDER — DIPHENHYDRAMINE HYDROCHLORIDE 50 MG/ML
50 INJECTION INTRAMUSCULAR; INTRAVENOUS ONCE
Status: CANCELLED | OUTPATIENT
Start: 2024-03-06 | End: 2024-03-06

## 2024-03-06 RX ORDER — HEPARIN 100 UNIT/ML
500 SYRINGE INTRAVENOUS PRN
Status: DISCONTINUED | OUTPATIENT
Start: 2024-03-06 | End: 2024-03-07 | Stop reason: HOSPADM

## 2024-03-06 RX ORDER — DEXAMETHASONE SODIUM PHOSPHATE 10 MG/ML
10 INJECTION INTRAMUSCULAR; INTRAVENOUS ONCE
Status: COMPLETED | OUTPATIENT
Start: 2024-03-06 | End: 2024-03-06

## 2024-03-06 RX ORDER — DIPHENHYDRAMINE HYDROCHLORIDE 50 MG/ML
50 INJECTION INTRAMUSCULAR; INTRAVENOUS
Status: CANCELLED | OUTPATIENT
Start: 2024-03-06

## 2024-03-06 RX ORDER — SODIUM CHLORIDE 0.9 % (FLUSH) 0.9 %
5-40 SYRINGE (ML) INJECTION PRN
Status: DISCONTINUED | OUTPATIENT
Start: 2024-03-06 | End: 2024-03-07 | Stop reason: HOSPADM

## 2024-03-06 RX ORDER — EPINEPHRINE 1 MG/ML
0.3 INJECTION, SOLUTION, CONCENTRATE INTRAVENOUS PRN
Status: CANCELLED | OUTPATIENT
Start: 2024-03-06

## 2024-03-06 RX ORDER — HEPARIN SODIUM (PORCINE) LOCK FLUSH IV SOLN 100 UNIT/ML 100 UNIT/ML
500 SOLUTION INTRAVENOUS PRN
Status: CANCELLED | OUTPATIENT
Start: 2024-03-06

## 2024-03-06 RX ORDER — FAMOTIDINE 10 MG/ML
20 INJECTION, SOLUTION INTRAVENOUS ONCE
Status: COMPLETED | OUTPATIENT
Start: 2024-03-06 | End: 2024-03-06

## 2024-03-06 RX ORDER — FAMOTIDINE 10 MG/ML
20 INJECTION, SOLUTION INTRAVENOUS ONCE
Status: CANCELLED | OUTPATIENT
Start: 2024-03-06 | End: 2024-03-06

## 2024-03-06 RX ORDER — PALONOSETRON 0.05 MG/ML
0.25 INJECTION, SOLUTION INTRAVENOUS ONCE
Status: COMPLETED | OUTPATIENT
Start: 2024-03-06 | End: 2024-03-06

## 2024-03-06 RX ORDER — PALONOSETRON 0.05 MG/ML
0.25 INJECTION, SOLUTION INTRAVENOUS ONCE
Status: CANCELLED | OUTPATIENT
Start: 2024-03-06 | End: 2024-03-06

## 2024-03-06 RX ORDER — DEXAMETHASONE 4 MG/1
TABLET ORAL
Qty: 40 TABLET | Refills: 2 | Status: SHIPPED | OUTPATIENT
Start: 2024-03-06

## 2024-03-06 RX ORDER — ONDANSETRON 2 MG/ML
8 INJECTION INTRAMUSCULAR; INTRAVENOUS
Status: CANCELLED | OUTPATIENT
Start: 2024-03-06

## 2024-03-06 RX ORDER — ACETAMINOPHEN 325 MG/1
650 TABLET ORAL
Status: CANCELLED | OUTPATIENT
Start: 2024-03-06

## 2024-03-06 RX ORDER — ALBUTEROL SULFATE 90 UG/1
4 AEROSOL, METERED RESPIRATORY (INHALATION) PRN
Status: CANCELLED | OUTPATIENT
Start: 2024-03-06

## 2024-03-06 RX ORDER — PROCHLORPERAZINE MALEATE 10 MG
10 TABLET ORAL EVERY 6 HOURS PRN
Qty: 120 TABLET | Refills: 3 | Status: SHIPPED | OUTPATIENT
Start: 2024-03-06

## 2024-03-06 RX ORDER — FAMOTIDINE 10 MG/ML
20 INJECTION, SOLUTION INTRAVENOUS
Status: CANCELLED | OUTPATIENT
Start: 2024-03-06

## 2024-03-06 RX ORDER — SODIUM CHLORIDE 9 MG/ML
INJECTION, SOLUTION INTRAVENOUS CONTINUOUS
Status: CANCELLED | OUTPATIENT
Start: 2024-03-06

## 2024-03-06 RX ORDER — SODIUM CHLORIDE 9 MG/ML
5-250 INJECTION, SOLUTION INTRAVENOUS PRN
Status: CANCELLED | OUTPATIENT
Start: 2024-03-06

## 2024-03-06 RX ORDER — SODIUM CHLORIDE 9 MG/ML
5-250 INJECTION, SOLUTION INTRAVENOUS PRN
Status: DISCONTINUED | OUTPATIENT
Start: 2024-03-06 | End: 2024-03-07 | Stop reason: HOSPADM

## 2024-03-06 RX ORDER — ONDANSETRON 8 MG/1
8 TABLET, ORALLY DISINTEGRATING ORAL EVERY 8 HOURS PRN
Qty: 30 TABLET | Refills: 1 | Status: SHIPPED | OUTPATIENT
Start: 2024-03-06

## 2024-03-06 RX ORDER — DIPHENHYDRAMINE HYDROCHLORIDE 50 MG/ML
50 INJECTION INTRAMUSCULAR; INTRAVENOUS ONCE
Status: COMPLETED | OUTPATIENT
Start: 2024-03-06 | End: 2024-03-06

## 2024-03-06 RX ORDER — MEPERIDINE HYDROCHLORIDE 50 MG/ML
12.5 INJECTION INTRAMUSCULAR; INTRAVENOUS; SUBCUTANEOUS PRN
Status: CANCELLED | OUTPATIENT
Start: 2024-03-06

## 2024-03-06 RX ORDER — LIDOCAINE AND PRILOCAINE 25; 25 MG/G; MG/G
CREAM TOPICAL
Qty: 1 EACH | Refills: 3 | Status: SHIPPED | OUTPATIENT
Start: 2024-03-06

## 2024-03-06 RX ADMIN — PALONOSETRON 0.25 MG: 0.05 INJECTION, SOLUTION INTRAVENOUS at 09:15

## 2024-03-06 RX ADMIN — HEPARIN 500 UNITS: 100 SYRINGE at 13:28

## 2024-03-06 RX ADMIN — CARBOPLATIN 370 MG: 450 INJECTION, SOLUTION INTRAVENOUS at 12:46

## 2024-03-06 RX ADMIN — SODIUM CHLORIDE, PRESERVATIVE FREE 10 ML: 5 INJECTION INTRAVENOUS at 13:28

## 2024-03-06 RX ADMIN — SODIUM CHLORIDE 150 MG: 900 INJECTION, SOLUTION INTRAVENOUS at 09:25

## 2024-03-06 RX ADMIN — DIPHENHYDRAMINE HYDROCHLORIDE 50 MG: 50 INJECTION INTRAMUSCULAR; INTRAVENOUS at 09:15

## 2024-03-06 RX ADMIN — DEXAMETHASONE SODIUM PHOSPHATE 10 MG: 10 INJECTION INTRAMUSCULAR; INTRAVENOUS at 09:15

## 2024-03-06 RX ADMIN — PACLITAXEL 312 MG: 6 INJECTION, SOLUTION, CONCENTRATE INTRAVENOUS at 09:57

## 2024-03-06 RX ADMIN — FAMOTIDINE 20 MG: 10 INJECTION, SOLUTION INTRAVENOUS at 09:15

## 2024-03-06 RX ADMIN — SODIUM CHLORIDE 50 ML/HR: 9 INJECTION, SOLUTION INTRAVENOUS at 09:14

## 2024-03-06 NOTE — PROGRESS NOTES
records  Pretreatment  was 388.  Reviewed logistics prognostic data and potential side effects.  Labs adequate for treatment  Patient counseled on use of antiemetics.  Will obtain scans after cycle 3.  Even though patient overall is doing well ECOG performance status 1 she will need close monitoring given her advanced age.  Patient underwent port placement without complications  NGS testing to assess patient for maintenance PARP inhibitor  Patient's conditions were taken into consideration when deciding risk-benefit for therapy.  Patient is at high risk for drug interaction risk of complications.  Given multiple comorbid conditions patient is at high risk for complication from intervention, risk of hospitalization, medical interaction overall life expectancy.  NCCN guidelines were reviewed and discussed with the patient.  The diagnosis and care plan were discussed with the patient in detail. I discussed the natural history of the disease, prognosis, risks and goals of therapy and answered all the patients questions to the best of my ability.  Patient expressed understanding and was in agreement.      KIERSTEN RUELAS MD    This note is created with the assistance of a speech recognition program.  While intending to generate a document that actually reflects the content of the visit, the document can still have some errors including those of syntax and sound a like substitutions which may escape proof reading.  It such instances, actual meaning can be extrapolated by contextual diversion.

## 2024-03-06 NOTE — PROGRESS NOTES
SPIRITUAL HEALTH PROGRESS NOTE: Outpatient Oncology Care at [facility]    Spiritual Assessment: Patient and Spouse were in the treatment cubicle of the infusion clinic. Pt and Spouse smiled and greeted writer. They welcomed and thanked writer for visiting. They shared that this was Pt's first treatment. Spouse acknowledged that the treatment Pt will receive will be \"severe.\" Pt and Spouse voiced hopes to take things \"one day at a time.\" Pt began singing a hymn that gives her strength. Pt expressed gratitude for the support and prayers of their Evangelical community. Spouse was tearful as he voiced his hopes and prayers for Pt. They were receptive to prayer and voiced their prayer requests.     Intervention: Writer provided supportive presence and active listening. Writer inquired about Pt's coping and needs. Writer affirmed Pt's and Spouse's strengths. Writer asked about Pt's sources of support and strength. Writer prayed for and with Pt and Spouse.     Outcome: Patient and Spouse voiced their hopes and beliefs about how Pt will do. Pt and Spouse spoke of their angle and angle community that support them. Pt and Spouse thanked writer for the visit. Spouse offered a blessing for Pt.     Plan: Chaplains will remain available to provide emotional and spiritual support as needed.       03/06/24 1201   Encounter Summary   Service Provided For: Patient and family together   [facility]   Support System Spouse;Children;Family members;Pentecostal/angle community   Last Encounter  03/06/24   Complexity of Encounter Moderate   Begin Time 1145   End Time  1200   Total Time Calculated 15 min   Spiritual/Emotional needs   Type Spiritual Support   Assessment/Intervention/Outcome   Assessment Calm;Coping;Peaceful   Intervention Active listening;Discussed illness injury and it’s impact;Discussed belief system/Confucianism practices/angle;Discussed relationship with God;Discussed meaning/purpose;Explored/Affirmed  feelings, thoughts, concerns;Explored Coping Skills/Resources;Sustaining Presence/Ministry of presence;Prayer (assurance of)/[place]   Outcome Coping;Encouraged;Engaged in conversation;Expressed feelings, needs, and concerns;Expressed Gratitude;Receptive;Peace   Plan and Referrals   Plan/Referrals Continue Support (comment)       Electronically signed by Celia Tenorio, Oncology Outpatient   [facility]  [phone]  3/6/2024  12:03 PM

## 2024-03-06 NOTE — TELEPHONE ENCOUNTER
Name: Melissa Bo  : 1945  MRN: [id number]    Oncology Navigation Follow-Up Note    Contact Type:  Medical Oncology    Notes: [facility] for Dr. Brown f/u & tx.  Met w/pt & pt's spouse prior to f/u.  Pt denied questions/concerns.  Encouraged to contact writer prn.  Will continue to follow.      Electronically signed by Kim Rainey RN on 3/6/2024 at 9:51 AM

## 2024-03-06 NOTE — PROGRESS NOTES
Pt here for C1D1 taxol/carbo   Arrives ambulatory   Denies complaint/concern   Labs drawn per port and reviewed   Pt seen by Dr. Brown, orders to proceed with tx   Tx complete without incident   Pt discharged in stable condition   Returns 3/13 for nurse eval and possible hydration

## 2024-03-06 NOTE — PROGRESS NOTES
Melissa  and her  Chris   were  here for chemotherapy teaching. Spent 60 minutes with them.  Teaching sheets from [facility] and verbal information given on chemotherapy agents, action, administration and side effects.    Chemotherapy medications discussed: taxol carboplatin     Pregnancy warnings reviewed: not indicated     Chemotherapy consent form signed by patient.    Provided new patient binder, Chemotherapy and You booklet, Eating Hints booklet      Port placement: 2/28     Pt has prescription for EMLA cream and was given instructions on use.    Reviewed take home medication: zofran and compazine     Questions answered and support given.    [facility] rehab referral assessment form, distress tool form and PG-SGA form completed by patient.    Needs that were identified during teaching visit: no needs identified, pt was drowsy d/t premeds     Discussed community resources such as [employer], [facility], [facility], [facility], [employer], etc.      Pt will return on 3/29 for C2D1 and f/u with Dr. Brown   on 3/29.     Sarah Steven RN

## 2024-03-06 NOTE — TELEPHONE ENCOUNTER
Tx Today   Labs and possible fluids next week with nurse eval   Rv in 3 weeks on Friday           Tx today as planned    Labs and nurse eval scheduled for 3/13/24 @ 9am with possible hyrdation    Rv scheduled for 3/29/24 @ 8am with tx to follow. Labs (cbc cmp) to be done at visit    An After Visit Summary was printed and given to the patient.    Electronically signed by Wendy Littlejohn on 3/6/2024 at 9:15 AM

## 2024-03-07 ENCOUNTER — CLINICAL DOCUMENTATION (OUTPATIENT)
Dept: ONCOLOGY | Age: 79
End: 2024-03-07

## 2024-03-07 NOTE — PROGRESS NOTES
[facility] Nutrition Screen:    PG-SGA screening form reviewed; not completely filled out but noted pt request to speak with dietitian. RD evaluation to follow.

## 2024-03-09 RX ORDER — OMEPRAZOLE 20 MG/1
20 CAPSULE, DELAYED RELEASE ORAL 2 TIMES DAILY
Qty: 60 CAPSULE | Refills: 1 | Status: SHIPPED | OUTPATIENT
Start: 2024-03-09

## 2024-03-11 DIAGNOSIS — C48.2 CANCER OF PERITONEUM (HCC): Primary | ICD-10-CM

## 2024-03-11 RX ORDER — HYDROCODONE BITARTRATE AND ACETAMINOPHEN 5; 325 MG/1; MG/1
1 TABLET ORAL EVERY 8 HOURS PRN
Qty: 45 TABLET | Refills: 0 | Status: SHIPPED | OUTPATIENT
Start: 2024-03-11 | End: 2024-03-26

## 2024-03-11 NOTE — TELEPHONE ENCOUNTER
Pt c/o abd pain and is requesting pain medications. Spoke with Dr. Brown; he states ok to route RX for norco 5/325mg 1 every 8 hours prn for qt 45 tabs. RX routed and pt notified.

## 2024-03-12 RX ORDER — DIPHENHYDRAMINE HYDROCHLORIDE 50 MG/ML
50 INJECTION INTRAMUSCULAR; INTRAVENOUS
Status: CANCELLED | OUTPATIENT
Start: 2024-03-13

## 2024-03-12 RX ORDER — ONDANSETRON 2 MG/ML
8 INJECTION INTRAMUSCULAR; INTRAVENOUS
Status: CANCELLED | OUTPATIENT
Start: 2024-03-13

## 2024-03-12 RX ORDER — ACETAMINOPHEN 325 MG/1
650 TABLET ORAL
Status: CANCELLED | OUTPATIENT
Start: 2024-03-13

## 2024-03-12 RX ORDER — ALBUTEROL SULFATE 90 UG/1
4 AEROSOL, METERED RESPIRATORY (INHALATION) PRN
Status: CANCELLED | OUTPATIENT
Start: 2024-03-13

## 2024-03-12 RX ORDER — EPINEPHRINE 1 MG/ML
0.3 INJECTION, SOLUTION, CONCENTRATE INTRAVENOUS PRN
Status: CANCELLED | OUTPATIENT
Start: 2024-03-13

## 2024-03-12 RX ORDER — SODIUM CHLORIDE 9 MG/ML
INJECTION, SOLUTION INTRAVENOUS CONTINUOUS
Status: CANCELLED | OUTPATIENT
Start: 2024-03-13

## 2024-03-12 RX ORDER — FAMOTIDINE 10 MG/ML
20 INJECTION, SOLUTION INTRAVENOUS
Status: CANCELLED | OUTPATIENT
Start: 2024-03-13

## 2024-03-13 ENCOUNTER — HOSPITAL ENCOUNTER (OUTPATIENT)
Dept: INFUSION THERAPY | Age: 79
Discharge: HOME | End: 2024-03-13
Payer: MEDICARE

## 2024-03-13 ENCOUNTER — TELEPHONE (OUTPATIENT)
Dept: ONCOLOGY | Age: 79
End: 2024-03-13

## 2024-03-13 VITALS
SYSTOLIC BLOOD PRESSURE: 123 MMHG | DIASTOLIC BLOOD PRESSURE: 64 MMHG | RESPIRATION RATE: 16 BRPM | TEMPERATURE: 97.8 F | HEART RATE: 80 BPM

## 2024-03-13 DIAGNOSIS — C48.2 CANCER OF PERITONEUM (HCC): Primary | ICD-10-CM

## 2024-03-13 LAB
ALBUMIN SERPL-MCNC: 3.7 G/DL (ref 3.5–5.2)
ALBUMIN/GLOB SERPL: 1.6 {RATIO} (ref 1–2.5)
ALP SERPL-CCNC: 119 U/L (ref 35–104)
ALT SERPL-CCNC: 26 U/L (ref 5–33)
ANION GAP SERPL CALCULATED.3IONS-SCNC: 11 MMOL/L (ref 9–17)
AST SERPL-CCNC: 18 U/L
BASOPHILS # BLD: 0.01 K/UL (ref 0–0.2)
BASOPHILS NFR BLD: 0 % (ref 0–2)
BILIRUB SERPL-MCNC: 0.5 MG/DL (ref 0.3–1.2)
BUN SERPL-MCNC: 19 MG/DL (ref 8–23)
CALCIUM SERPL-MCNC: 9 MG/DL (ref 8.6–10.4)
CHLORIDE SERPL-SCNC: 102 MMOL/L (ref 98–107)
CO2 SERPL-SCNC: 26 MMOL/L (ref 20–31)
CREAT SERPL-MCNC: 1 MG/DL (ref 0.5–0.9)
EOSINOPHIL # BLD: 0.16 K/UL (ref 0–0.4)
EOSINOPHILS RELATIVE PERCENT: 6 % (ref 1–4)
ERYTHROCYTE [DISTWIDTH] IN BLOOD BY AUTOMATED COUNT: 12.9 % (ref 12.5–15.4)
GFR SERPL CREATININE-BSD FRML MDRD: 57 ML/MIN/1.73M2
GLUCOSE SERPL-MCNC: 135 MG/DL (ref 70–99)
HCT VFR BLD AUTO: 41.1 % (ref 36–46)
HGB BLD-MCNC: 13.7 G/DL (ref 12–16)
LYMPHOCYTES NFR BLD: 1.01 K/UL (ref 1–4.8)
LYMPHOCYTES RELATIVE PERCENT: 37 % (ref 24–44)
MCH RBC QN AUTO: 31.5 PG (ref 26–34)
MCHC RBC AUTO-ENTMCNC: 33.3 G/DL (ref 31–37)
MCV RBC AUTO: 94.5 FL (ref 80–100)
MONOCYTES NFR BLD: 0.1 K/UL (ref 0.1–1.2)
MONOCYTES NFR BLD: 4 % (ref 2–11)
NEUTROPHILS NFR BLD: 53 % (ref 36–66)
NEUTS SEG NFR BLD: 1.48 K/UL (ref 1.8–7.7)
PLATELET # BLD AUTO: 192 K/UL (ref 140–450)
PMV BLD AUTO: 12.2 FL (ref 8–14)
POTASSIUM SERPL-SCNC: 4.1 MMOL/L (ref 3.7–5.3)
PROT SERPL-MCNC: 6 G/DL (ref 6.4–8.3)
RBC # BLD AUTO: 4.35 M/UL (ref 4–5.2)
SODIUM SERPL-SCNC: 139 MMOL/L (ref 135–144)
WBC OTHER # BLD: 2.8 K/UL (ref 3.5–11)

## 2024-03-13 PROCEDURE — 80053 COMPREHEN METABOLIC PANEL: CPT

## 2024-03-13 PROCEDURE — 96360 HYDRATION IV INFUSION INIT: CPT

## 2024-03-13 PROCEDURE — 96361 HYDRATE IV INFUSION ADD-ON: CPT

## 2024-03-13 PROCEDURE — 6360000002 HC RX W HCPCS: Performed by: INTERNAL MEDICINE

## 2024-03-13 PROCEDURE — 2580000003 HC RX 258: Performed by: INTERNAL MEDICINE

## 2024-03-13 PROCEDURE — 85025 COMPLETE CBC W/AUTO DIFF WBC: CPT

## 2024-03-13 PROCEDURE — 36591 DRAW BLOOD OFF VENOUS DEVICE: CPT

## 2024-03-13 RX ORDER — ONDANSETRON 2 MG/ML
8 INJECTION INTRAMUSCULAR; INTRAVENOUS
OUTPATIENT
Start: 2024-03-13

## 2024-03-13 RX ORDER — SODIUM CHLORIDE 0.9 % (FLUSH) 0.9 %
5-40 SYRINGE (ML) INJECTION PRN
Status: DISCONTINUED | OUTPATIENT
Start: 2024-03-13 | End: 2024-03-14 | Stop reason: HOSPADM

## 2024-03-13 RX ORDER — 0.9 % SODIUM CHLORIDE 0.9 %
1000 INTRAVENOUS SOLUTION INTRAVENOUS PRN
Status: DISCONTINUED | OUTPATIENT
Start: 2024-03-13 | End: 2024-03-14 | Stop reason: HOSPADM

## 2024-03-13 RX ORDER — SODIUM CHLORIDE 9 MG/ML
INJECTION, SOLUTION INTRAVENOUS CONTINUOUS
OUTPATIENT
Start: 2024-03-13

## 2024-03-13 RX ORDER — ACETAMINOPHEN 325 MG/1
650 TABLET ORAL
OUTPATIENT
Start: 2024-03-13

## 2024-03-13 RX ORDER — FAMOTIDINE 10 MG/ML
20 INJECTION, SOLUTION INTRAVENOUS
OUTPATIENT
Start: 2024-03-13

## 2024-03-13 RX ORDER — SODIUM CHLORIDE 0.9 % (FLUSH) 0.9 %
5-40 SYRINGE (ML) INJECTION PRN
OUTPATIENT
Start: 2024-03-13

## 2024-03-13 RX ORDER — 0.9 % SODIUM CHLORIDE 0.9 %
1000 INTRAVENOUS SOLUTION INTRAVENOUS PRN
Status: CANCELLED | OUTPATIENT
Start: 2024-03-13

## 2024-03-13 RX ORDER — SODIUM CHLORIDE 9 MG/ML
5-250 INJECTION, SOLUTION INTRAVENOUS PRN
OUTPATIENT
Start: 2024-03-13

## 2024-03-13 RX ORDER — DIPHENHYDRAMINE HYDROCHLORIDE 50 MG/ML
50 INJECTION INTRAMUSCULAR; INTRAVENOUS
OUTPATIENT
Start: 2024-03-13

## 2024-03-13 RX ORDER — HEPARIN 100 UNIT/ML
500 SYRINGE INTRAVENOUS PRN
OUTPATIENT
Start: 2024-03-13

## 2024-03-13 RX ORDER — HEPARIN 100 UNIT/ML
500 SYRINGE INTRAVENOUS PRN
Status: DISCONTINUED | OUTPATIENT
Start: 2024-03-13 | End: 2024-03-14 | Stop reason: HOSPADM

## 2024-03-13 RX ORDER — SODIUM CHLORIDE 9 MG/ML
5-250 INJECTION, SOLUTION INTRAVENOUS PRN
Status: DISCONTINUED | OUTPATIENT
Start: 2024-03-13 | End: 2024-03-14 | Stop reason: HOSPADM

## 2024-03-13 RX ORDER — EPINEPHRINE 1 MG/ML
0.3 INJECTION, SOLUTION, CONCENTRATE INTRAVENOUS PRN
OUTPATIENT
Start: 2024-03-13

## 2024-03-13 RX ORDER — ALBUTEROL SULFATE 90 UG/1
4 AEROSOL, METERED RESPIRATORY (INHALATION) PRN
OUTPATIENT
Start: 2024-03-13

## 2024-03-13 RX ADMIN — SODIUM CHLORIDE, PRESERVATIVE FREE 10 ML: 5 INJECTION INTRAVENOUS at 09:28

## 2024-03-13 RX ADMIN — HEPARIN 500 UNITS: 100 SYRINGE at 12:02

## 2024-03-13 RX ADMIN — SODIUM CHLORIDE, PRESERVATIVE FREE 10 ML: 5 INJECTION INTRAVENOUS at 12:02

## 2024-03-13 RX ADMIN — SODIUM CHLORIDE, PRESERVATIVE FREE 10 ML: 5 INJECTION INTRAVENOUS at 09:29

## 2024-03-13 RX ADMIN — SODIUM CHLORIDE 1000 ML: 9 INJECTION, SOLUTION INTRAVENOUS at 10:30

## 2024-03-13 NOTE — TELEPHONE ENCOUNTER
[facility] Oncology Nutrition Note:   Writer called patient per her request. Pt reports she currently has a good appetite. Does c/o early satiety; encouraged small frequent intake. Weight is relatively stable recently. Pt inquiring about what she should or should not be eating. Writer encouraged a nutrient dense, well-balanced diet. Diet discussed with patient and all questions answered. Pt appreciative of call.   Writer encouraged patient to contact writer with any future nutrition questions or concerns; pt states she has contact information in folder.     Marleny Paris RD, LD, CNSC  Registered Dietitian  [facility]  [phone]

## 2024-03-13 NOTE — TELEPHONE ENCOUNTER
Name: Melissa Bo  : 1945  MRN: [id number]    Oncology Navigation Follow-Up Note    Contact Type:  Medical Oncology    Notes: [facility] @ [facility] for [facility]F's.  Met w/pt & pt's family in infusion area.  Pt's spouse verbalized concerns w/chemotherapy txs on  & no staff available Saturday/.  Dr. Brown in infusion area, updated on spouse's concerns.  Dr. Brown stated okay to schedule chemotherapy on .  Deb, [facility] , updated & requested chemotherapy rescheduled.  Dr. Brown f/u & C2 chemotherapy rescheduled to Wednesday 3/27.  Pt updated & upcoming appt schedule given to pt.  Pt & spouse verbalized understanding on schedule change & thanked writer.  Will continue to follow.      Electronically signed by Kim Rainey RN on 3/13/2024 at 12:25 PM

## 2024-03-13 NOTE — PROGRESS NOTES
Pt here for possible hydration   Arrives ambulatory   Denies complaint/concern   Labs drawn per port   Tx complete without incident   Pt discharged in stable condition   Returns 3/27 for MD f/u and C2D1 taxol/carbo

## 2024-03-27 ENCOUNTER — TELEPHONE (OUTPATIENT)
Dept: ONCOLOGY | Age: 79
End: 2024-03-27

## 2024-03-27 ENCOUNTER — OFFICE VISIT (OUTPATIENT)
Dept: ONCOLOGY | Age: 79
End: 2024-03-27
Payer: MEDICARE

## 2024-03-27 ENCOUNTER — HOSPITAL ENCOUNTER (OUTPATIENT)
Dept: INFUSION THERAPY | Age: 79
Discharge: HOME | End: 2024-03-27
Payer: MEDICARE

## 2024-03-27 VITALS
SYSTOLIC BLOOD PRESSURE: 148 MMHG | BODY MASS INDEX: 24.33 KG/M2 | OXYGEN SATURATION: 97 % | DIASTOLIC BLOOD PRESSURE: 88 MMHG | WEIGHT: 146.2 LBS | TEMPERATURE: 96.8 F | HEART RATE: 93 BPM

## 2024-03-27 VITALS — RESPIRATION RATE: 16 BRPM

## 2024-03-27 DIAGNOSIS — C48.2 CANCER OF PERITONEUM (HCC): Primary | ICD-10-CM

## 2024-03-27 DIAGNOSIS — Z51.11 CHEMOTHERAPY MANAGEMENT, ENCOUNTER FOR: ICD-10-CM

## 2024-03-27 LAB
ALBUMIN SERPL-MCNC: 4.1 G/DL (ref 3.5–5.2)
ALBUMIN/GLOB SERPL: 1.5 {RATIO} (ref 1–2.5)
ALP SERPL-CCNC: 141 U/L (ref 35–104)
ALT SERPL-CCNC: 14 U/L (ref 5–33)
ANION GAP SERPL CALCULATED.3IONS-SCNC: 13 MMOL/L (ref 9–17)
AST SERPL-CCNC: 16 U/L
BASOPHILS # BLD: 0 K/UL (ref 0–0.2)
BASOPHILS NFR BLD: 0 % (ref 0–2)
BILIRUB SERPL-MCNC: 0.3 MG/DL (ref 0.3–1.2)
BUN SERPL-MCNC: 17 MG/DL (ref 8–23)
CALCIUM SERPL-MCNC: 9.2 MG/DL (ref 8.6–10.4)
CHLORIDE SERPL-SCNC: 105 MMOL/L (ref 98–107)
CO2 SERPL-SCNC: 23 MMOL/L (ref 20–31)
CREAT SERPL-MCNC: 0.8 MG/DL (ref 0.5–0.9)
EOSINOPHIL # BLD: 0 K/UL (ref 0–0.4)
EOSINOPHILS RELATIVE PERCENT: 0 % (ref 1–4)
ERYTHROCYTE [DISTWIDTH] IN BLOOD BY AUTOMATED COUNT: 13.3 % (ref 12.5–15.4)
GFR SERPL CREATININE-BSD FRML MDRD: 74 ML/MIN/1.73M2
GLUCOSE SERPL-MCNC: 186 MG/DL (ref 70–99)
HCT VFR BLD AUTO: 41.8 % (ref 36–46)
HGB BLD-MCNC: 14.2 G/DL (ref 12–16)
LYMPHOCYTES NFR BLD: 1.1 K/UL (ref 1–4.8)
LYMPHOCYTES RELATIVE PERCENT: 10 % (ref 24–44)
MCH RBC QN AUTO: 31 PG (ref 26–34)
MCHC RBC AUTO-ENTMCNC: 33.9 G/DL (ref 31–37)
MCV RBC AUTO: 91.4 FL (ref 80–100)
MONOCYTES NFR BLD: 0.1 K/UL (ref 0.1–1.2)
MONOCYTES NFR BLD: 1 % (ref 2–11)
NEUTROPHILS NFR BLD: 89 % (ref 36–66)
NEUTS SEG NFR BLD: 9.7 K/UL (ref 1.8–7.7)
PLATELET # BLD AUTO: 270 K/UL (ref 140–450)
PMV BLD AUTO: 9.8 FL (ref 6–12)
POTASSIUM SERPL-SCNC: 4.2 MMOL/L (ref 3.7–5.3)
PROT SERPL-MCNC: 6.8 G/DL (ref 6.4–8.3)
RBC # BLD AUTO: 4.57 M/UL (ref 4–5.2)
SODIUM SERPL-SCNC: 141 MMOL/L (ref 135–144)
WBC OTHER # BLD: 10.9 K/UL (ref 3.5–11)

## 2024-03-27 PROCEDURE — 36591 DRAW BLOOD OFF VENOUS DEVICE: CPT

## 2024-03-27 PROCEDURE — 2500000003 HC RX 250 WO HCPCS: Performed by: INTERNAL MEDICINE

## 2024-03-27 PROCEDURE — 96375 TX/PRO/DX INJ NEW DRUG ADDON: CPT

## 2024-03-27 PROCEDURE — 85025 COMPLETE CBC W/AUTO DIFF WBC: CPT

## 2024-03-27 PROCEDURE — 96413 CHEMO IV INFUSION 1 HR: CPT

## 2024-03-27 PROCEDURE — 80053 COMPREHEN METABOLIC PANEL: CPT

## 2024-03-27 PROCEDURE — 96415 CHEMO IV INFUSION ADDL HR: CPT

## 2024-03-27 PROCEDURE — 99211 OFF/OP EST MAY X REQ PHY/QHP: CPT | Performed by: INTERNAL MEDICINE

## 2024-03-27 PROCEDURE — 6360000002 HC RX W HCPCS: Performed by: INTERNAL MEDICINE

## 2024-03-27 PROCEDURE — 1123F ACP DISCUSS/DSCN MKR DOCD: CPT | Performed by: INTERNAL MEDICINE

## 2024-03-27 PROCEDURE — 96417 CHEMO IV INFUS EACH ADDL SEQ: CPT

## 2024-03-27 PROCEDURE — 99215 OFFICE O/P EST HI 40 MIN: CPT | Performed by: INTERNAL MEDICINE

## 2024-03-27 PROCEDURE — 2580000003 HC RX 258: Performed by: INTERNAL MEDICINE

## 2024-03-27 PROCEDURE — 96367 TX/PROPH/DG ADDL SEQ IV INF: CPT

## 2024-03-27 RX ORDER — SODIUM CHLORIDE 9 MG/ML
INJECTION, SOLUTION INTRAVENOUS CONTINUOUS
Status: CANCELLED | OUTPATIENT
Start: 2024-03-27

## 2024-03-27 RX ORDER — DIPHENHYDRAMINE HYDROCHLORIDE 50 MG/ML
50 INJECTION INTRAMUSCULAR; INTRAVENOUS
Status: CANCELLED | OUTPATIENT
Start: 2024-03-27

## 2024-03-27 RX ORDER — FAMOTIDINE 10 MG/ML
20 INJECTION, SOLUTION INTRAVENOUS
Status: CANCELLED | OUTPATIENT
Start: 2024-03-27

## 2024-03-27 RX ORDER — ONDANSETRON 2 MG/ML
8 INJECTION INTRAMUSCULAR; INTRAVENOUS
Status: CANCELLED | OUTPATIENT
Start: 2024-03-27

## 2024-03-27 RX ORDER — SODIUM CHLORIDE 9 MG/ML
5-250 INJECTION, SOLUTION INTRAVENOUS PRN
Status: CANCELLED | OUTPATIENT
Start: 2024-03-27

## 2024-03-27 RX ORDER — MEPERIDINE HYDROCHLORIDE 50 MG/ML
12.5 INJECTION INTRAMUSCULAR; INTRAVENOUS; SUBCUTANEOUS PRN
Status: CANCELLED | OUTPATIENT
Start: 2024-03-27

## 2024-03-27 RX ORDER — HEPARIN 100 UNIT/ML
500 SYRINGE INTRAVENOUS PRN
Status: DISCONTINUED | OUTPATIENT
Start: 2024-03-27 | End: 2024-03-28 | Stop reason: HOSPADM

## 2024-03-27 RX ORDER — DEXAMETHASONE SODIUM PHOSPHATE 10 MG/ML
10 INJECTION INTRAMUSCULAR; INTRAVENOUS ONCE
Status: COMPLETED | OUTPATIENT
Start: 2024-03-27 | End: 2024-03-27

## 2024-03-27 RX ORDER — DEXAMETHASONE 4 MG/1
TABLET ORAL
Qty: 40 TABLET | Refills: 2 | Status: SHIPPED | OUTPATIENT
Start: 2024-03-27

## 2024-03-27 RX ORDER — SODIUM CHLORIDE 9 MG/ML
5-250 INJECTION, SOLUTION INTRAVENOUS PRN
Status: DISCONTINUED | OUTPATIENT
Start: 2024-03-27 | End: 2024-03-28 | Stop reason: HOSPADM

## 2024-03-27 RX ORDER — PALONOSETRON 0.05 MG/ML
0.25 INJECTION, SOLUTION INTRAVENOUS ONCE
Status: COMPLETED | OUTPATIENT
Start: 2024-03-27 | End: 2024-03-27

## 2024-03-27 RX ORDER — ACETAMINOPHEN 325 MG/1
650 TABLET ORAL
Status: CANCELLED | OUTPATIENT
Start: 2024-03-27

## 2024-03-27 RX ORDER — ALBUTEROL SULFATE 90 UG/1
4 AEROSOL, METERED RESPIRATORY (INHALATION) PRN
Status: CANCELLED | OUTPATIENT
Start: 2024-03-27

## 2024-03-27 RX ORDER — FAMOTIDINE 10 MG/ML
20 INJECTION, SOLUTION INTRAVENOUS ONCE
Status: COMPLETED | OUTPATIENT
Start: 2024-03-27 | End: 2024-03-27

## 2024-03-27 RX ORDER — SODIUM CHLORIDE 0.9 % (FLUSH) 0.9 %
5-40 SYRINGE (ML) INJECTION PRN
Status: DISCONTINUED | OUTPATIENT
Start: 2024-03-27 | End: 2024-03-28 | Stop reason: HOSPADM

## 2024-03-27 RX ORDER — DIPHENHYDRAMINE HYDROCHLORIDE 50 MG/ML
50 INJECTION INTRAMUSCULAR; INTRAVENOUS ONCE
Status: COMPLETED | OUTPATIENT
Start: 2024-03-27 | End: 2024-03-27

## 2024-03-27 RX ORDER — EPINEPHRINE 1 MG/ML
0.3 INJECTION, SOLUTION, CONCENTRATE INTRAVENOUS PRN
Status: CANCELLED | OUTPATIENT
Start: 2024-03-27

## 2024-03-27 RX ADMIN — PALONOSETRON 0.25 MG: 0.05 INJECTION, SOLUTION INTRAVENOUS at 10:32

## 2024-03-27 RX ADMIN — DIPHENHYDRAMINE HYDROCHLORIDE 50 MG: 50 INJECTION, SOLUTION INTRAMUSCULAR; INTRAVENOUS at 10:32

## 2024-03-27 RX ADMIN — SODIUM CHLORIDE 200 ML/HR: 9 INJECTION, SOLUTION INTRAVENOUS at 10:31

## 2024-03-27 RX ADMIN — HEPARIN 500 UNITS: 100 SYRINGE at 15:05

## 2024-03-27 RX ADMIN — CARBOPLATIN 420 MG: 10 INJECTION INTRAVENOUS at 14:28

## 2024-03-27 RX ADMIN — SODIUM CHLORIDE, PRESERVATIVE FREE 10 ML: 5 INJECTION INTRAVENOUS at 15:05

## 2024-03-27 RX ADMIN — FAMOTIDINE 20 MG: 10 INJECTION, SOLUTION INTRAVENOUS at 10:32

## 2024-03-27 RX ADMIN — FOSAPREPITANT 150 MG: 150 INJECTION, POWDER, LYOPHILIZED, FOR SOLUTION INTRAVENOUS at 10:49

## 2024-03-27 RX ADMIN — PACLITAXEL 312 MG: 6 INJECTION, SOLUTION, CONCENTRATE INTRAVENOUS at 11:16

## 2024-03-27 RX ADMIN — DEXAMETHASONE SODIUM PHOSPHATE 10 MG: 10 INJECTION INTRAMUSCULAR; INTRAVENOUS at 10:32

## 2024-03-27 NOTE — TELEPHONE ENCOUNTER
Name: Melissa Bo  : 1945  MRN: [id number]    Oncology Navigation Follow-Up Note    Contact Type:  Telephone    Notes: [facility] @ [facility] for Dr. Brown f/u & tx.  Met w/pt & pt's spouse prior to f/u.  Pt stated hair fell out & received wig from [facility].  Pt stated recently met friend w/dx of similar cancer & share experiences.  Discussed OCC & their support group \"[employer] [facility]\".  OCC brochure given to pt.  Pt denied questions/concerns for writer.  Encouraged to contact writer prn.  Will continue to follow.    Electronically signed by Kim Rainey RN on 3/27/2024 at 11:48 AM

## 2024-03-27 NOTE — TELEPHONE ENCOUNTER
Tx today   Ivf fluid , nurse eval and labs ( cbc cmp ) on d 8   Rv in 3 weeks           Tx today as planned    Hydration and nurse eval with labs (cbc cmp) scheduled for 4/3/24 @ 1pm    Rv scheduled for 4/17/24 @ 845am with tx to follow. Labs (cbc,cmp) to be done at visit    An After Visit Summary was printed and given to the patient.    Electronically signed by Wendy Littlejohn on 3/27/2024 at 11:13 AM

## 2024-03-27 NOTE — PROGRESS NOTES
Patient arrived for C2D1 taxol, carbo. Labs within treatable parameters. Seen by Dr. Brown today, ok to proceed with treatment. Patient tolerated w/o incident and left instable condition. Returns 4/3 for IVF, labs and nurse eval.

## 2024-03-27 NOTE — TELEPHONE ENCOUNTER
Tx today   Ivf fluid , nurse eval and labs ( cbc cmp ) on d 8   Rv in 3 weeks     Tx today as scheduled    Fluids on 4/3/24 with labs and evaluation    Rv scheduled for 4/17/24 @ 845am    PT was given AVS and appt schedule    Electronically signed by Sarah El on 3/27/2024 at 11:22 AM

## 2024-03-27 NOTE — PROGRESS NOTES
performed during the hospital encounter of 03/27/24   Comprehensive Metabolic Panel   Result Value Ref Range    Sodium 141 135 - 144 mmol/L    Potassium 4.2 3.7 - 5.3 mmol/L    Chloride 105 98 - 107 mmol/L    CO2 23 20 - 31 mmol/L    Anion Gap 13 9 - 17 mmol/L    Glucose 186 (H) 70 - 99 mg/dL    BUN 17 8 - 23 mg/dL    Creatinine 0.8 0.5 - 0.9 mg/dL    Est, Glom Filt Rate 74 >60 mL/min/1.73m2    Calcium 9.2 8.6 - 10.4 mg/dL    Total Protein 6.8 6.4 - 8.3 g/dL    Albumin 4.1 3.5 - 5.2 g/dL    Albumin/Globulin Ratio 1.5 1.0 - 2.5    Total Bilirubin 0.3 0.3 - 1.2 mg/dL    Alkaline Phosphatase 141 (H) 35 - 104 U/L    ALT 14 5 - 33 U/L    AST 16 <32 U/L   CBC with Auto Differential   Result Value Ref Range    WBC 10.9 3.5 - 11.0 k/uL    RBC 4.57 4.0 - 5.2 m/uL    Hemoglobin 14.2 12.0 - 16.0 g/dL    Hematocrit 41.8 36 - 46 %    MCV 91.4 80 - 100 fL    MCH 31.0 26 - 34 pg    MCHC 33.9 31 - 37 g/dL    RDW 13.3 12.5 - 15.4 %    Platelets 270 140 - 450 k/uL    MPV 9.8 6.0 - 12.0 fL    Neutrophils % 89 (H) 36 - 66 %    Lymphocytes % 10 (L) 24 - 44 %    Monocytes % 1 (L) 2 - 11 %    Eosinophils % 0 (L) 1 - 4 %    Basophils % 0 0 - 2 %    Neutrophils Absolute 9.70 (H) 1.8 - 7.7 k/uL    Lymphocytes Absolute 1.10 1.0 - 4.8 k/uL    Monocytes Absolute 0.10 0.1 - 1.2 k/uL    Eosinophils Absolute 0.00 0.0 - 0.4 k/uL    Basophils Absolute 0.00 0.0 - 0.2 k/uL     IR PORT PLACEMENT < 5 YEARS    Result Date: 2/28/2024  PROCEDURE: ULTRASOUND GUIDED VASCULAR ACCESS. FLUOROSCOPY GUIDED PLACEMENT OF A SUBCUTANEOUS PORT-A-CATH. 2/28/2024. 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 FLUOROSCOPY DOSE AND TYPE: Fluoroscopy time-0.6 minutes Radiation Exposure Index: DAP cGy*m2, 64 TECHNIQUE: Informed consent was obtained after a detailed explanation of the procedure including risks, benefits, and alternatives. All

## 2024-03-28 DIAGNOSIS — C56.1 MALIGNANT NEOPLASM OF RIGHT OVARY (HCC): Primary | ICD-10-CM

## 2024-03-28 DIAGNOSIS — C48.2 CANCER OF PERITONEUM (HCC): ICD-10-CM

## 2024-04-03 ENCOUNTER — HOSPITAL ENCOUNTER (OUTPATIENT)
Dept: INFUSION THERAPY | Age: 79
Discharge: HOME | End: 2024-04-03
Payer: MEDICARE

## 2024-04-03 VITALS — HEART RATE: 86 BPM | DIASTOLIC BLOOD PRESSURE: 75 MMHG | RESPIRATION RATE: 18 BRPM | SYSTOLIC BLOOD PRESSURE: 121 MMHG

## 2024-04-03 DIAGNOSIS — C48.2 CANCER OF PERITONEUM (HCC): Primary | ICD-10-CM

## 2024-04-03 LAB
ALBUMIN SERPL-MCNC: 3.5 G/DL (ref 3.5–5.2)
ALBUMIN/GLOB SERPL: 1.3 {RATIO} (ref 1–2.5)
ALP SERPL-CCNC: 102 U/L (ref 35–104)
ALT SERPL-CCNC: 16 U/L (ref 5–33)
ANION GAP SERPL CALCULATED.3IONS-SCNC: 10 MMOL/L (ref 9–17)
AST SERPL-CCNC: 15 U/L
BASOPHILS # BLD: 0.01 K/UL (ref 0–0.2)
BASOPHILS NFR BLD: 1 % (ref 0–2)
BILIRUB SERPL-MCNC: 0.6 MG/DL (ref 0.3–1.2)
BUN SERPL-MCNC: 22 MG/DL (ref 8–23)
CALCIUM SERPL-MCNC: 9.1 MG/DL (ref 8.6–10.4)
CHLORIDE SERPL-SCNC: 101 MMOL/L (ref 98–107)
CO2 SERPL-SCNC: 27 MMOL/L (ref 20–31)
CREAT SERPL-MCNC: 0.8 MG/DL (ref 0.5–0.9)
EOSINOPHIL # BLD: 0.12 K/UL (ref 0–0.4)
EOSINOPHILS RELATIVE PERCENT: 6 % (ref 1–4)
ERYTHROCYTE [DISTWIDTH] IN BLOOD BY AUTOMATED COUNT: 13 % (ref 12.5–15.4)
GFR SERPL CREATININE-BSD FRML MDRD: 74 ML/MIN/1.73M2
GLUCOSE SERPL-MCNC: 147 MG/DL (ref 70–99)
HCT VFR BLD AUTO: 37.9 % (ref 36–46)
HGB BLD-MCNC: 12.9 G/DL (ref 12–16)
LYMPHOCYTES NFR BLD: 0.95 K/UL (ref 1–4.8)
LYMPHOCYTES RELATIVE PERCENT: 44 % (ref 24–44)
MCH RBC QN AUTO: 31.7 PG (ref 26–34)
MCHC RBC AUTO-ENTMCNC: 34 G/DL (ref 31–37)
MCV RBC AUTO: 93.1 FL (ref 80–100)
MONOCYTES NFR BLD: 0.25 K/UL (ref 0.1–1.2)
MONOCYTES NFR BLD: 12 % (ref 2–11)
NEUTROPHILS NFR BLD: 37 % (ref 36–66)
NEUTS SEG NFR BLD: 0.77 K/UL (ref 1.8–7.7)
PLATELET # BLD AUTO: 165 K/UL (ref 140–450)
PMV BLD AUTO: 13 FL (ref 8–14)
POTASSIUM SERPL-SCNC: 3.9 MMOL/L (ref 3.7–5.3)
PROT SERPL-MCNC: 6.1 G/DL (ref 6.4–8.3)
RBC # BLD AUTO: 4.07 M/UL (ref 4–5.2)
SODIUM SERPL-SCNC: 138 MMOL/L (ref 135–144)
WBC OTHER # BLD: 2.1 K/UL (ref 3.5–11)

## 2024-04-03 PROCEDURE — 85025 COMPLETE CBC W/AUTO DIFF WBC: CPT

## 2024-04-03 PROCEDURE — 6360000002 HC RX W HCPCS: Performed by: INTERNAL MEDICINE

## 2024-04-03 PROCEDURE — 2580000003 HC RX 258: Performed by: INTERNAL MEDICINE

## 2024-04-03 PROCEDURE — 80053 COMPREHEN METABOLIC PANEL: CPT

## 2024-04-03 PROCEDURE — 96361 HYDRATE IV INFUSION ADD-ON: CPT

## 2024-04-03 PROCEDURE — 96360 HYDRATION IV INFUSION INIT: CPT

## 2024-04-03 PROCEDURE — 36591 DRAW BLOOD OFF VENOUS DEVICE: CPT

## 2024-04-03 RX ORDER — HEPARIN 100 UNIT/ML
500 SYRINGE INTRAVENOUS PRN
Status: DISCONTINUED | OUTPATIENT
Start: 2024-04-03 | End: 2024-04-04 | Stop reason: HOSPADM

## 2024-04-03 RX ORDER — FAMOTIDINE 10 MG/ML
20 INJECTION, SOLUTION INTRAVENOUS
OUTPATIENT
Start: 2024-04-03

## 2024-04-03 RX ORDER — 0.9 % SODIUM CHLORIDE 0.9 %
1000 INTRAVENOUS SOLUTION INTRAVENOUS PRN
Status: DISCONTINUED | OUTPATIENT
Start: 2024-04-03 | End: 2024-04-04 | Stop reason: HOSPADM

## 2024-04-03 RX ORDER — ONDANSETRON 2 MG/ML
8 INJECTION INTRAMUSCULAR; INTRAVENOUS
OUTPATIENT
Start: 2024-04-03

## 2024-04-03 RX ORDER — HEPARIN 100 UNIT/ML
500 SYRINGE INTRAVENOUS PRN
OUTPATIENT
Start: 2024-04-03

## 2024-04-03 RX ORDER — SODIUM CHLORIDE 9 MG/ML
5-250 INJECTION, SOLUTION INTRAVENOUS PRN
OUTPATIENT
Start: 2024-04-03

## 2024-04-03 RX ORDER — 0.9 % SODIUM CHLORIDE 0.9 %
1000 INTRAVENOUS SOLUTION INTRAVENOUS PRN
Start: 2024-04-03

## 2024-04-03 RX ORDER — SODIUM CHLORIDE 0.9 % (FLUSH) 0.9 %
5-40 SYRINGE (ML) INJECTION PRN
Status: DISCONTINUED | OUTPATIENT
Start: 2024-04-03 | End: 2024-04-04 | Stop reason: HOSPADM

## 2024-04-03 RX ORDER — SODIUM CHLORIDE 9 MG/ML
INJECTION, SOLUTION INTRAVENOUS CONTINUOUS
OUTPATIENT
Start: 2024-04-03

## 2024-04-03 RX ORDER — DIPHENHYDRAMINE HYDROCHLORIDE 50 MG/ML
50 INJECTION INTRAMUSCULAR; INTRAVENOUS
OUTPATIENT
Start: 2024-04-03

## 2024-04-03 RX ORDER — ALBUTEROL SULFATE 90 UG/1
4 AEROSOL, METERED RESPIRATORY (INHALATION) PRN
OUTPATIENT
Start: 2024-04-03

## 2024-04-03 RX ORDER — SODIUM CHLORIDE 0.9 % (FLUSH) 0.9 %
5-40 SYRINGE (ML) INJECTION PRN
OUTPATIENT
Start: 2024-04-03

## 2024-04-03 RX ORDER — ACETAMINOPHEN 325 MG/1
650 TABLET ORAL
OUTPATIENT
Start: 2024-04-03

## 2024-04-03 RX ORDER — EPINEPHRINE 1 MG/ML
0.3 INJECTION, SOLUTION, CONCENTRATE INTRAVENOUS PRN
OUTPATIENT
Start: 2024-04-03

## 2024-04-03 RX ADMIN — HEPARIN 500 UNITS: 100 SYRINGE at 14:37

## 2024-04-03 RX ADMIN — SODIUM CHLORIDE, PRESERVATIVE FREE 10 ML: 5 INJECTION INTRAVENOUS at 14:37

## 2024-04-03 RX ADMIN — SODIUM CHLORIDE 1000 ML: 9 INJECTION, SOLUTION INTRAVENOUS at 13:12

## 2024-04-03 NOTE — PROGRESS NOTES
Pt arrives ambulatory for hydration and lab check  Pt reports continued fatigue and low appetite  Labs drawn per med port and reviewed.    Hydration complete and patient discharged in stable condition  Next appt  4/17 MD visit followed by Carbo/taxol  C3D1

## 2024-04-04 ENCOUNTER — TELEPHONE (OUTPATIENT)
Dept: INFUSION THERAPY | Age: 79
End: 2024-04-04

## 2024-04-04 NOTE — TELEPHONE ENCOUNTER
Pt's  called stating pt is coming down with a cold, and has been coughing for the past 2-3 days. He states she seems congested, and is asking what she can take. Writer advised for pt to try OTC decongestant and any other cold medications to help with sx management. Advised to call back if pt sx worsened. He verbalized understanding.

## 2024-04-05 DIAGNOSIS — C48.2 CANCER OF PERITONEUM (HCC): Primary | ICD-10-CM

## 2024-04-05 RX ORDER — CIPROFLOXACIN 500 MG/1
500 TABLET, FILM COATED ORAL 2 TIMES DAILY
Qty: 14 TABLET | Refills: 0 | Status: SHIPPED | OUTPATIENT
Start: 2024-04-05 | End: 2024-04-12

## 2024-04-09 LAB
PD-L1 BY 22C3 TISS IMSTN DOC: NEGATIVE
TEMPUS PD-L1 (22C3) COMBINED POSITIVE SCORE: <1
TEMPUS PD-L1 (22C3) TUMOR PROPORTION SCORE: <1 %
TEMPUS PORTAL: NORMAL

## 2024-04-10 ENCOUNTER — TELEPHONE (OUTPATIENT)
Dept: ONCOLOGY | Age: 79
End: 2024-04-10

## 2024-04-10 NOTE — TELEPHONE ENCOUNTER
Name: Melissa Bo  : 1945  MRN: [id number]    Oncology Navigation Follow-Up Note    Contact Type:  Medical Oncology    Notes: Dr. Brown alerted writer antibiotic started last week r/t pt's c/o coughing & congestion.  Dr. Brown requested writer f/u w/pt today.  Attempted to contact pt, no answer, VM left updated on Dr. Brown's request & requested pt contact writer.  Will continue to follow.      Electronically signed by Kim Rainey RN on 4/10/2024 at 11:01 AM

## 2024-04-11 ENCOUNTER — TELEPHONE (OUTPATIENT)
Dept: ONCOLOGY | Age: 79
End: 2024-04-11

## 2024-04-11 DIAGNOSIS — C56.1 MALIGNANT NEOPLASM OF RIGHT OVARY (HCC): Primary | ICD-10-CM

## 2024-04-11 NOTE — TELEPHONE ENCOUNTER
Name: Melissa Bo  : 1945  MRN: [id number]    Oncology Navigation Follow-Up Note    Contact Type:  Telephone    Notes: Pt called back stating continues cipro as ordered, c/o cough, & stated afebrile.  Pt stated received call from [facility] & inquired on [facility].  Discussed NGS testing ordered by Dr. Brown.  Upon review of chart noted blood draw for [facility] not completed.  Notified pt writer will coordinate blood draw upon arrival to [facility] .  Pt verbalized understanding & denied questions/concerns.  [facility] FAA completed w/pt.  Upon completion of application noted pt approved for $0 out of pocket costs, pt updated.  Jacqueline, [facility] lab, updated on pt.  Will continue to follow.          Electronically signed by Kim Rainey RN on 2024 at 9:36 AM

## 2024-04-16 ENCOUNTER — HOSPITAL ENCOUNTER (OUTPATIENT)
Age: 79
Setting detail: SPECIMEN
Discharge: HOME | End: 2024-04-16

## 2024-04-17 ENCOUNTER — OFFICE VISIT (OUTPATIENT)
Dept: ONCOLOGY | Age: 79
End: 2024-04-17
Payer: MEDICARE

## 2024-04-17 ENCOUNTER — TELEPHONE (OUTPATIENT)
Dept: ONCOLOGY | Age: 79
End: 2024-04-17

## 2024-04-17 ENCOUNTER — HOSPITAL ENCOUNTER (OUTPATIENT)
Dept: INFUSION THERAPY | Age: 79
Discharge: HOME | End: 2024-04-17
Payer: MEDICARE

## 2024-04-17 VITALS
HEART RATE: 96 BPM | BODY MASS INDEX: 23.59 KG/M2 | WEIGHT: 141.6 LBS | OXYGEN SATURATION: 94 % | DIASTOLIC BLOOD PRESSURE: 74 MMHG | HEIGHT: 65 IN | SYSTOLIC BLOOD PRESSURE: 117 MMHG

## 2024-04-17 DIAGNOSIS — C56.1 MALIGNANT NEOPLASM OF RIGHT OVARY (HCC): ICD-10-CM

## 2024-04-17 DIAGNOSIS — C48.2 CANCER OF PERITONEUM (HCC): ICD-10-CM

## 2024-04-17 DIAGNOSIS — C48.2 CANCER OF PERITONEUM (HCC): Primary | ICD-10-CM

## 2024-04-17 DIAGNOSIS — C56.1 MALIGNANT NEOPLASM OF RIGHT OVARY (HCC): Primary | ICD-10-CM

## 2024-04-17 DIAGNOSIS — Z51.11 CHEMOTHERAPY MANAGEMENT, ENCOUNTER FOR: ICD-10-CM

## 2024-04-17 LAB
ALBUMIN SERPL-MCNC: 3.9 G/DL (ref 3.5–5.2)
ALBUMIN/GLOB SERPL: 1.3 {RATIO} (ref 1–2.5)
ALP SERPL-CCNC: 116 U/L (ref 35–104)
ALT SERPL-CCNC: 9 U/L (ref 5–33)
ANION GAP SERPL CALCULATED.3IONS-SCNC: 12 MMOL/L (ref 9–17)
AST SERPL-CCNC: 15 U/L
BASOPHILS # BLD: 0 K/UL (ref 0–0.2)
BASOPHILS NFR BLD: 0 % (ref 0–2)
BILIRUB SERPL-MCNC: 0.4 MG/DL (ref 0.3–1.2)
BUN SERPL-MCNC: 19 MG/DL (ref 8–23)
CALCIUM SERPL-MCNC: 9.4 MG/DL (ref 8.6–10.4)
CANCER AG125 SERPL-ACNC: 71 U/ML (ref 0–38)
CHLORIDE SERPL-SCNC: 104 MMOL/L (ref 98–107)
CO2 SERPL-SCNC: 22 MMOL/L (ref 20–31)
CREAT SERPL-MCNC: 0.8 MG/DL (ref 0.5–0.9)
EOSINOPHIL # BLD: 0 K/UL (ref 0–0.4)
EOSINOPHILS RELATIVE PERCENT: 0 % (ref 1–4)
ERYTHROCYTE [DISTWIDTH] IN BLOOD BY AUTOMATED COUNT: 14.2 % (ref 12.5–15.4)
GFR SERPL CREATININE-BSD FRML MDRD: 74 ML/MIN/1.73M2
GLUCOSE SERPL-MCNC: 262 MG/DL (ref 70–99)
HCT VFR BLD AUTO: 40.3 % (ref 36–46)
HGB BLD-MCNC: 13.4 G/DL (ref 12–16)
LYMPHOCYTES NFR BLD: 0.8 K/UL (ref 1–4.8)
LYMPHOCYTES RELATIVE PERCENT: 9 % (ref 24–44)
MCH RBC QN AUTO: 30.5 PG (ref 26–34)
MCHC RBC AUTO-ENTMCNC: 33.4 G/DL (ref 31–37)
MCV RBC AUTO: 91.4 FL (ref 80–100)
MONOCYTES NFR BLD: 0.1 K/UL (ref 0.1–1.2)
MONOCYTES NFR BLD: 1 % (ref 2–11)
NEUTROPHILS NFR BLD: 90 % (ref 36–66)
NEUTS SEG NFR BLD: 8.4 K/UL (ref 1.8–7.7)
PLATELET # BLD AUTO: 382 K/UL (ref 140–450)
PMV BLD AUTO: 9.4 FL (ref 6–12)
POTASSIUM SERPL-SCNC: 4.2 MMOL/L (ref 3.7–5.3)
PROT SERPL-MCNC: 6.8 G/DL (ref 6.4–8.3)
RBC # BLD AUTO: 4.41 M/UL (ref 4–5.2)
SODIUM SERPL-SCNC: 138 MMOL/L (ref 135–144)
SURGICAL PATHOLOGY REPORT: NORMAL
WBC OTHER # BLD: 9.3 K/UL (ref 3.5–11)

## 2024-04-17 PROCEDURE — 2500000003 HC RX 250 WO HCPCS: Performed by: INTERNAL MEDICINE

## 2024-04-17 PROCEDURE — 96415 CHEMO IV INFUSION ADDL HR: CPT

## 2024-04-17 PROCEDURE — 85025 COMPLETE CBC W/AUTO DIFF WBC: CPT

## 2024-04-17 PROCEDURE — 99215 OFFICE O/P EST HI 40 MIN: CPT | Performed by: INTERNAL MEDICINE

## 2024-04-17 PROCEDURE — 2580000003 HC RX 258: Performed by: INTERNAL MEDICINE

## 2024-04-17 PROCEDURE — 36591 DRAW BLOOD OFF VENOUS DEVICE: CPT

## 2024-04-17 PROCEDURE — 1123F ACP DISCUSS/DSCN MKR DOCD: CPT | Performed by: INTERNAL MEDICINE

## 2024-04-17 PROCEDURE — 6360000002 HC RX W HCPCS: Performed by: INTERNAL MEDICINE

## 2024-04-17 PROCEDURE — 86304 IMMUNOASSAY TUMOR CA 125: CPT

## 2024-04-17 PROCEDURE — 80053 COMPREHEN METABOLIC PANEL: CPT

## 2024-04-17 PROCEDURE — 96413 CHEMO IV INFUSION 1 HR: CPT

## 2024-04-17 PROCEDURE — 96375 TX/PRO/DX INJ NEW DRUG ADDON: CPT

## 2024-04-17 PROCEDURE — 96366 THER/PROPH/DIAG IV INF ADDON: CPT

## 2024-04-17 RX ORDER — HYDROCODONE BITARTRATE AND ACETAMINOPHEN 5; 325 MG/1; MG/1
1 TABLET ORAL EVERY 6 HOURS PRN
Qty: 45 TABLET | Refills: 0 | Status: SHIPPED | OUTPATIENT
Start: 2024-04-17 | End: 2024-05-02

## 2024-04-17 RX ORDER — FAMOTIDINE 10 MG/ML
20 INJECTION, SOLUTION INTRAVENOUS
Status: CANCELLED | OUTPATIENT
Start: 2024-04-17

## 2024-04-17 RX ORDER — FAMOTIDINE 10 MG/ML
20 INJECTION, SOLUTION INTRAVENOUS ONCE
Status: CANCELLED | OUTPATIENT
Start: 2024-04-17 | End: 2024-04-17

## 2024-04-17 RX ORDER — PALONOSETRON 0.05 MG/ML
0.25 INJECTION, SOLUTION INTRAVENOUS ONCE
Status: CANCELLED | OUTPATIENT
Start: 2024-04-17 | End: 2024-04-17

## 2024-04-17 RX ORDER — ALBUTEROL SULFATE 90 UG/1
4 AEROSOL, METERED RESPIRATORY (INHALATION) PRN
Status: CANCELLED | OUTPATIENT
Start: 2024-04-17

## 2024-04-17 RX ORDER — METOPROLOL SUCCINATE 50 MG/1
50 TABLET, EXTENDED RELEASE ORAL DAILY
Qty: 30 TABLET | Refills: 0 | Status: SHIPPED | OUTPATIENT
Start: 2024-04-17

## 2024-04-17 RX ORDER — PALONOSETRON 0.05 MG/ML
0.25 INJECTION, SOLUTION INTRAVENOUS ONCE
Status: COMPLETED | OUTPATIENT
Start: 2024-04-17 | End: 2024-04-17

## 2024-04-17 RX ORDER — SODIUM CHLORIDE 9 MG/ML
5-250 INJECTION, SOLUTION INTRAVENOUS PRN
Status: CANCELLED | OUTPATIENT
Start: 2024-04-17

## 2024-04-17 RX ORDER — SODIUM CHLORIDE 9 MG/ML
INJECTION, SOLUTION INTRAVENOUS CONTINUOUS
Status: CANCELLED | OUTPATIENT
Start: 2024-04-17

## 2024-04-17 RX ORDER — HEPARIN 100 UNIT/ML
500 SYRINGE INTRAVENOUS PRN
Status: DISCONTINUED | OUTPATIENT
Start: 2024-04-17 | End: 2024-04-18 | Stop reason: HOSPADM

## 2024-04-17 RX ORDER — DIPHENHYDRAMINE HYDROCHLORIDE 50 MG/ML
50 INJECTION INTRAMUSCULAR; INTRAVENOUS ONCE
Status: COMPLETED | OUTPATIENT
Start: 2024-04-17 | End: 2024-04-17

## 2024-04-17 RX ORDER — ONDANSETRON 2 MG/ML
8 INJECTION INTRAMUSCULAR; INTRAVENOUS
Status: CANCELLED | OUTPATIENT
Start: 2024-04-17

## 2024-04-17 RX ORDER — DIPHENHYDRAMINE HYDROCHLORIDE 50 MG/ML
50 INJECTION INTRAMUSCULAR; INTRAVENOUS ONCE
Status: CANCELLED | OUTPATIENT
Start: 2024-04-17 | End: 2024-04-17

## 2024-04-17 RX ORDER — SODIUM CHLORIDE 0.9 % (FLUSH) 0.9 %
5-40 SYRINGE (ML) INJECTION PRN
Status: DISCONTINUED | OUTPATIENT
Start: 2024-04-17 | End: 2024-04-18 | Stop reason: HOSPADM

## 2024-04-17 RX ORDER — MEPERIDINE HYDROCHLORIDE 50 MG/ML
12.5 INJECTION INTRAMUSCULAR; INTRAVENOUS; SUBCUTANEOUS PRN
Status: CANCELLED | OUTPATIENT
Start: 2024-04-17

## 2024-04-17 RX ORDER — ACETAMINOPHEN 325 MG/1
650 TABLET ORAL
Status: CANCELLED | OUTPATIENT
Start: 2024-04-17

## 2024-04-17 RX ORDER — SODIUM CHLORIDE 9 MG/ML
5-250 INJECTION, SOLUTION INTRAVENOUS PRN
Status: DISCONTINUED | OUTPATIENT
Start: 2024-04-17 | End: 2024-04-18 | Stop reason: HOSPADM

## 2024-04-17 RX ORDER — FAMOTIDINE 10 MG/ML
20 INJECTION, SOLUTION INTRAVENOUS ONCE
Status: COMPLETED | OUTPATIENT
Start: 2024-04-17 | End: 2024-04-17

## 2024-04-17 RX ORDER — DEXAMETHASONE SODIUM PHOSPHATE 10 MG/ML
10 INJECTION INTRAMUSCULAR; INTRAVENOUS ONCE
Status: COMPLETED | OUTPATIENT
Start: 2024-04-17 | End: 2024-04-17

## 2024-04-17 RX ORDER — DIPHENHYDRAMINE HYDROCHLORIDE 50 MG/ML
50 INJECTION INTRAMUSCULAR; INTRAVENOUS
Status: CANCELLED | OUTPATIENT
Start: 2024-04-17

## 2024-04-17 RX ORDER — EPINEPHRINE 1 MG/ML
0.3 INJECTION, SOLUTION, CONCENTRATE INTRAVENOUS PRN
Status: CANCELLED | OUTPATIENT
Start: 2024-04-17

## 2024-04-17 RX ADMIN — CARBOPLATIN 330 MG: 10 INJECTION INTRAVENOUS at 14:45

## 2024-04-17 RX ADMIN — PACLITAXEL 222 MG: 6 INJECTION, SOLUTION INTRAVENOUS at 11:43

## 2024-04-17 RX ADMIN — SODIUM CHLORIDE 150 ML/HR: 9 INJECTION, SOLUTION INTRAVENOUS at 10:47

## 2024-04-17 RX ADMIN — DIPHENHYDRAMINE HYDROCHLORIDE 50 MG: 50 INJECTION, SOLUTION INTRAMUSCULAR; INTRAVENOUS at 10:48

## 2024-04-17 RX ADMIN — PALONOSETRON 0.25 MG: 0.05 INJECTION, SOLUTION INTRAVENOUS at 10:47

## 2024-04-17 RX ADMIN — SODIUM CHLORIDE, PRESERVATIVE FREE 10 ML: 5 INJECTION INTRAVENOUS at 08:56

## 2024-04-17 RX ADMIN — SODIUM CHLORIDE, PRESERVATIVE FREE 10 ML: 5 INJECTION INTRAVENOUS at 15:22

## 2024-04-17 RX ADMIN — HEPARIN 500 UNITS: 100 SYRINGE at 15:22

## 2024-04-17 RX ADMIN — DEXAMETHASONE SODIUM PHOSPHATE 10 MG: 10 INJECTION INTRAMUSCULAR; INTRAVENOUS at 10:50

## 2024-04-17 RX ADMIN — FAMOTIDINE 20 MG: 10 INJECTION, SOLUTION INTRAVENOUS at 10:52

## 2024-04-17 RX ADMIN — SODIUM CHLORIDE 150 MG: 900 INJECTION, SOLUTION INTRAVENOUS at 11:12

## 2024-04-17 NOTE — TELEPHONE ENCOUNTER
Tx today   Fluids next next   Scans before rv   Rv on 5/3   Ca 125 with next treatment        Tx today as scheduled    Hydration on 4/24/24 @ 1:00pm    CT scan scheduled for 4/25/24 @ 8:00am @ [facility]    RV scheduled for 5/3/24 @ 12:30pm    Next tx is 5/8/24 - MD herndon  added to labs    PT was given AVS and appt schedule    Electronically signed by Sarah El on 4/17/2024 at 11:53 AM     Additional Information: (Optional): The wound was cleaned, and a pressure dressing was applied.  The patient received detailed post-op instructions.

## 2024-04-17 NOTE — PROGRESS NOTES
Patient arrived for C3D1 taxol/carbo. Pt had visit with Dr Brown, labs reviewed and within treatable limits, received order to proceed with dose reduced treatment today. Treatment completed without issue. Pt stable at discharge. Scheduled to return 4/24/24 for hydration.

## 2024-04-17 NOTE — PROGRESS NOTES
will need close monitoring given her advanced age.  NGS testing to assess patient for maintenance PARP inhibitor  Patient's conditions were taken into consideration when deciding risk-benefit for therapy.  Patient is at high risk for drug interaction risk of complications.  Given multiple comorbid conditions patient is at high risk for complication from intervention, risk of hospitalization, medical interaction overall life expectancy.  NCCN guidelines were reviewed and discussed with the patient.  The diagnosis and care plan were discussed with the patient in detail. I discussed the natural history of the disease, prognosis, risks and goals of therapy and answered all the patients questions to the best of my ability.  Patient expressed understanding and was in agreement.      KIERSTEN RUELAS MD    This note is created with the assistance of a speech recognition program.  While intending to generate a document that actually reflects the content of the visit, the document can still have some errors including those of syntax and sound a like substitutions which may escape proof reading.  It such instances, actual meaning can be extrapolated by contextual diversion.

## 2024-04-18 LAB — TEST NAME: NORMAL

## 2024-04-19 LAB
DNA RANGE(S) EXAMINED NAR: NORMAL
GENE DIS ANL INTERP-IMP: NORMAL
GENE DIS ASSESSED: NORMAL
GENE MUT TESTED BLD/T: 6.3 M/MB
MLH1+MSH2+MSH6+PMS2 GN DEL+DUP+FUL M: NORMAL
MMR ENDO PMS2 CA SPEC QL IMSTN: PRESENT
MMR PROT MLH1 CA SPEC QL IMSTN: PRESENT
MMR PROT MSH2 CA SPEC QL IMSTN: PRESENT
MMR PROT MSH6 CA SPEC QL IMSTN: PRESENT
MSI CA SPEC-IMP: NORMAL
PD-L1 BY 22C3 TISS IMSTN DOC: NEGATIVE
REASON FOR STUDY: NORMAL
TEMPUS FUSIONADDENDUM: NORMAL
TEMPUS LCA: NORMAL
TEMPUS PD-L1 (22C3) COMBINED POSITIVE SCORE: <1
TEMPUS PD-L1 (22C3) TUMOR PROPORTION SCORE: <1 %
TEMPUS PORTAL: NORMAL
TEMPUS TRIALCOUNT: 1
TEMPUS TRIALMATCHES1: NORMAL

## 2024-04-24 ENCOUNTER — HOSPITAL ENCOUNTER (OUTPATIENT)
Dept: INFUSION THERAPY | Age: 79
Discharge: HOME | End: 2024-04-24
Payer: MEDICARE

## 2024-04-24 VITALS
SYSTOLIC BLOOD PRESSURE: 144 MMHG | HEART RATE: 89 BPM | RESPIRATION RATE: 18 BRPM | DIASTOLIC BLOOD PRESSURE: 82 MMHG | TEMPERATURE: 97.7 F

## 2024-04-24 DIAGNOSIS — C48.2 CANCER OF PERITONEUM (HCC): Primary | ICD-10-CM

## 2024-04-24 PROCEDURE — 6360000002 HC RX W HCPCS: Performed by: INTERNAL MEDICINE

## 2024-04-24 PROCEDURE — 96361 HYDRATE IV INFUSION ADD-ON: CPT

## 2024-04-24 PROCEDURE — 96360 HYDRATION IV INFUSION INIT: CPT

## 2024-04-24 PROCEDURE — 2580000003 HC RX 258: Performed by: INTERNAL MEDICINE

## 2024-04-24 RX ORDER — FAMOTIDINE 10 MG/ML
20 INJECTION, SOLUTION INTRAVENOUS
OUTPATIENT
Start: 2024-04-24

## 2024-04-24 RX ORDER — 0.9 % SODIUM CHLORIDE 0.9 %
1000 INTRAVENOUS SOLUTION INTRAVENOUS PRN
Status: DISCONTINUED | OUTPATIENT
Start: 2024-04-24 | End: 2024-04-25 | Stop reason: HOSPADM

## 2024-04-24 RX ORDER — SODIUM CHLORIDE 9 MG/ML
INJECTION, SOLUTION INTRAVENOUS CONTINUOUS
OUTPATIENT
Start: 2024-04-24

## 2024-04-24 RX ORDER — SODIUM CHLORIDE 0.9 % (FLUSH) 0.9 %
5-40 SYRINGE (ML) INJECTION PRN
OUTPATIENT
Start: 2024-04-24

## 2024-04-24 RX ORDER — ALBUTEROL SULFATE 90 UG/1
4 AEROSOL, METERED RESPIRATORY (INHALATION) PRN
OUTPATIENT
Start: 2024-04-24

## 2024-04-24 RX ORDER — DIPHENHYDRAMINE HYDROCHLORIDE 50 MG/ML
50 INJECTION INTRAMUSCULAR; INTRAVENOUS
OUTPATIENT
Start: 2024-04-24

## 2024-04-24 RX ORDER — HEPARIN 100 UNIT/ML
500 SYRINGE INTRAVENOUS PRN
OUTPATIENT
Start: 2024-04-24

## 2024-04-24 RX ORDER — SODIUM CHLORIDE 9 MG/ML
5-250 INJECTION, SOLUTION INTRAVENOUS PRN
OUTPATIENT
Start: 2024-04-24

## 2024-04-24 RX ORDER — SODIUM CHLORIDE 0.9 % (FLUSH) 0.9 %
5-40 SYRINGE (ML) INJECTION PRN
Status: DISCONTINUED | OUTPATIENT
Start: 2024-04-24 | End: 2024-04-25 | Stop reason: HOSPADM

## 2024-04-24 RX ORDER — EPINEPHRINE 1 MG/ML
0.3 INJECTION, SOLUTION, CONCENTRATE INTRAVENOUS PRN
OUTPATIENT
Start: 2024-04-24

## 2024-04-24 RX ORDER — 0.9 % SODIUM CHLORIDE 0.9 %
1000 INTRAVENOUS SOLUTION INTRAVENOUS PRN
Start: 2024-04-24

## 2024-04-24 RX ORDER — ACETAMINOPHEN 325 MG/1
650 TABLET ORAL
OUTPATIENT
Start: 2024-04-24

## 2024-04-24 RX ORDER — HEPARIN 100 UNIT/ML
500 SYRINGE INTRAVENOUS PRN
Status: DISCONTINUED | OUTPATIENT
Start: 2024-04-24 | End: 2024-04-25 | Stop reason: HOSPADM

## 2024-04-24 RX ORDER — ONDANSETRON 2 MG/ML
8 INJECTION INTRAMUSCULAR; INTRAVENOUS
OUTPATIENT
Start: 2024-04-24

## 2024-04-24 RX ADMIN — SODIUM CHLORIDE, PRESERVATIVE FREE 10 ML: 5 INJECTION INTRAVENOUS at 13:12

## 2024-04-24 RX ADMIN — SODIUM CHLORIDE, PRESERVATIVE FREE 10 ML: 5 INJECTION INTRAVENOUS at 13:11

## 2024-04-24 RX ADMIN — SODIUM CHLORIDE, PRESERVATIVE FREE 10 ML: 5 INJECTION INTRAVENOUS at 14:55

## 2024-04-24 RX ADMIN — SODIUM CHLORIDE 1000 ML: 9 INJECTION, SOLUTION INTRAVENOUS at 13:15

## 2024-04-24 RX ADMIN — HEPARIN 500 UNITS: 100 SYRINGE at 14:55

## 2024-04-24 NOTE — PROGRESS NOTES
Patient arrived for hydration. Tolerated w/o incident and left in stable condition. Returns 5/3 for    anterior/lateral

## 2024-04-25 ENCOUNTER — HOSPITAL ENCOUNTER (OUTPATIENT)
Dept: CT IMAGING | Age: 79
Discharge: HOME | End: 2024-04-27
Attending: INTERNAL MEDICINE
Payer: MEDICARE

## 2024-04-25 ENCOUNTER — TELEPHONE (OUTPATIENT)
Dept: INFUSION THERAPY | Age: 79
End: 2024-04-25

## 2024-04-25 DIAGNOSIS — C56.1 MALIGNANT NEOPLASM OF RIGHT OVARY (HCC): ICD-10-CM

## 2024-04-25 DIAGNOSIS — Z51.11 CHEMOTHERAPY MANAGEMENT, ENCOUNTER FOR: ICD-10-CM

## 2024-04-25 DIAGNOSIS — C48.2 CANCER OF PERITONEUM (HCC): Primary | ICD-10-CM

## 2024-04-25 PROCEDURE — 6360000004 HC RX CONTRAST MEDICATION: Performed by: INTERNAL MEDICINE

## 2024-04-25 PROCEDURE — 74177 CT ABD & PELVIS W/CONTRAST: CPT

## 2024-04-25 PROCEDURE — 2500000003 HC RX 250 WO HCPCS: Performed by: INTERNAL MEDICINE

## 2024-04-25 RX ADMIN — IOPAMIDOL 100 ML: 755 INJECTION, SOLUTION INTRAVENOUS at 08:50

## 2024-04-25 RX ADMIN — BARIUM SULFATE 450 ML: 20 SUSPENSION ORAL at 08:50

## 2024-04-25 NOTE — TELEPHONE ENCOUNTER
Call from radiology regarding CT scan done today. Dr. Brown notified of PE - rt lower lung. New order received to stop ASA and Eliquis starter pack ordered. PC to pt - voiced understanding.

## 2024-04-28 LAB
DNA RANGE(S) EXAMINED NAR: NORMAL
GENE DIS ANL INTERP-IMP: NORMAL
GENE DIS ASSESSED: NORMAL
MSI CA SPEC-IMP: NOT DETECTED
REASON FOR STUDY: NORMAL
TEMPUS LCA: NORMAL
TEMPUS PORTAL: NORMAL
TEMPUS TRIALCOUNT: 2
TEMPUS TRIALMATCHES1: NORMAL
TEMPUS TRIALMATCHES2: NORMAL

## 2024-05-03 ENCOUNTER — CLINICAL DOCUMENTATION (OUTPATIENT)
Dept: ONCOLOGY | Age: 79
End: 2024-05-03

## 2024-05-03 ENCOUNTER — OFFICE VISIT (OUTPATIENT)
Dept: ONCOLOGY | Age: 79
End: 2024-05-03
Payer: MEDICARE

## 2024-05-03 ENCOUNTER — TELEPHONE (OUTPATIENT)
Dept: ONCOLOGY | Age: 79
End: 2024-05-03

## 2024-05-03 VITALS
DIASTOLIC BLOOD PRESSURE: 86 MMHG | BODY MASS INDEX: 23.36 KG/M2 | OXYGEN SATURATION: 99 % | SYSTOLIC BLOOD PRESSURE: 138 MMHG | WEIGHT: 140.4 LBS | HEART RATE: 78 BPM | TEMPERATURE: 97.5 F

## 2024-05-03 DIAGNOSIS — Z51.11 CHEMOTHERAPY MANAGEMENT, ENCOUNTER FOR: ICD-10-CM

## 2024-05-03 DIAGNOSIS — C48.2 CANCER OF PERITONEUM (HCC): ICD-10-CM

## 2024-05-03 DIAGNOSIS — C56.1 MALIGNANT NEOPLASM OF RIGHT OVARY (HCC): Primary | ICD-10-CM

## 2024-05-03 PROCEDURE — 99211 OFF/OP EST MAY X REQ PHY/QHP: CPT | Performed by: INTERNAL MEDICINE

## 2024-05-03 PROCEDURE — 1123F ACP DISCUSS/DSCN MKR DOCD: CPT | Performed by: INTERNAL MEDICINE

## 2024-05-03 PROCEDURE — 99215 OFFICE O/P EST HI 40 MIN: CPT | Performed by: INTERNAL MEDICINE

## 2024-05-03 RX ORDER — MEPERIDINE HYDROCHLORIDE 50 MG/ML
12.5 INJECTION INTRAMUSCULAR; INTRAVENOUS; SUBCUTANEOUS PRN
OUTPATIENT
Start: 2024-05-08

## 2024-05-03 RX ORDER — PALONOSETRON 0.05 MG/ML
0.25 INJECTION, SOLUTION INTRAVENOUS ONCE
OUTPATIENT
Start: 2024-05-08 | End: 2024-05-08

## 2024-05-03 RX ORDER — SODIUM CHLORIDE 9 MG/ML
INJECTION, SOLUTION INTRAVENOUS CONTINUOUS
OUTPATIENT
Start: 2024-05-08

## 2024-05-03 RX ORDER — ALBUTEROL SULFATE 90 UG/1
4 AEROSOL, METERED RESPIRATORY (INHALATION) PRN
OUTPATIENT
Start: 2024-05-08

## 2024-05-03 RX ORDER — ONDANSETRON 2 MG/ML
8 INJECTION INTRAMUSCULAR; INTRAVENOUS
OUTPATIENT
Start: 2024-05-08

## 2024-05-03 RX ORDER — EPINEPHRINE 1 MG/ML
0.3 INJECTION, SOLUTION, CONCENTRATE INTRAVENOUS PRN
OUTPATIENT
Start: 2024-05-08

## 2024-05-03 RX ORDER — SODIUM CHLORIDE 0.9 % (FLUSH) 0.9 %
5-40 SYRINGE (ML) INJECTION PRN
OUTPATIENT
Start: 2024-05-08

## 2024-05-03 RX ORDER — FAMOTIDINE 10 MG/ML
20 INJECTION, SOLUTION INTRAVENOUS
OUTPATIENT
Start: 2024-05-08

## 2024-05-03 RX ORDER — SODIUM CHLORIDE 9 MG/ML
5-250 INJECTION, SOLUTION INTRAVENOUS PRN
OUTPATIENT
Start: 2024-05-08

## 2024-05-03 RX ORDER — DIPHENHYDRAMINE HYDROCHLORIDE 50 MG/ML
50 INJECTION INTRAMUSCULAR; INTRAVENOUS
OUTPATIENT
Start: 2024-05-08

## 2024-05-03 RX ORDER — DIPHENHYDRAMINE HYDROCHLORIDE 50 MG/ML
50 INJECTION INTRAMUSCULAR; INTRAVENOUS ONCE
OUTPATIENT
Start: 2024-05-08 | End: 2024-05-08

## 2024-05-03 RX ORDER — HEPARIN SODIUM (PORCINE) LOCK FLUSH IV SOLN 100 UNIT/ML 100 UNIT/ML
500 SOLUTION INTRAVENOUS PRN
OUTPATIENT
Start: 2024-05-08

## 2024-05-03 RX ORDER — ACETAMINOPHEN 325 MG/1
650 TABLET ORAL
OUTPATIENT
Start: 2024-05-08

## 2024-05-03 RX ORDER — FAMOTIDINE 10 MG/ML
20 INJECTION, SOLUTION INTRAVENOUS ONCE
OUTPATIENT
Start: 2024-05-08 | End: 2024-05-08

## 2024-05-03 NOTE — TELEPHONE ENCOUNTER
Tx next week as planned  Rv with c 5       Tx today as scheduled    Rv scheduled 5/29/24 @ 8:15am with tx to follow    PT was given AVS and appt schedule    Electronically signed by Sarah El on 5/3/2024 at 2:05 PM

## 2024-05-03 NOTE — PROGRESS NOTES
Nutrition Diagnosis and Goal  Problem: Unintentional weight loss  Etiology/related to: sub-optimal energy intake   Symptoms/Signs/as evidenced by: 8% weight loss over last 2.5 months       Goal: Adequate nutrient intake for weight maintenance and to optimize nutrition status while undergoing cancer treatment        Marleny Paris RD, LD, CNSC  Registered Dietitian  [facility]  [phone]

## 2024-05-03 NOTE — PROGRESS NOTES
Melissa Bo                                                                                                                  5/3/2024  MRN:   [id number]  YOB: 1945  PCP:                           Gagandeep Melgoza DO  Referring Physician: No ref. provider found  Treating Physician Name: KIERSTEN RUELAS MD      Reason for visit:  Chief Complaint   Patient presents with    Follow-up     Review status of disease   Patient presents to the clinic for toxicity check and to discuss results of lab work-up, imaging studies and further treatment plan.    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  Patient CT scan shows response to therapy.  Denies patient can 9 cm compared to 13 at presentation.  Patient denies abdominal pain.  Does complain of being weak and dizzy at times.  Denies abdominal pain.  Denies bloody bowel movement.  There is concern regarding patient's abdominal mass encroaching the intestines.  Patient started on anticoagulation for pulmonary embolism    During this visit patient's allergy, social, medical, surgical history and medications were reviewed and updated.    Summary of Case/History:  Melissa Bo a 78 y.o.female who presents to the clinic to establish care for further workup and evaluation for recently diagnosed gynecological cancer likely right ovary primary with peritoneal metastasis.  Patient presents to the office accompanied by her  and daughter.  Patient was initially seen by gynecology at [facility] due to concerns regarding postmenopausal bleeding.  She has been having

## 2024-05-05 LAB
DNA RANGE(S) EXAMINED NAR: NORMAL
GENE DIS ANL INTERP-IMP: NORMAL
GENE DIS ASSESSED: NORMAL
GENE MUT TESTED BLD/T: 6.3 M/MB
MLH1+MSH2+MSH6+PMS2 GN DEL+DUP+FUL M: NORMAL
MMR ENDO PMS2 CA SPEC QL IMSTN: PRESENT
MMR PROT MLH1 CA SPEC QL IMSTN: PRESENT
MMR PROT MSH2 CA SPEC QL IMSTN: PRESENT
MMR PROT MSH6 CA SPEC QL IMSTN: PRESENT
MSI CA SPEC-IMP: NORMAL
PD-L1 BY 22C3 TISS IMSTN DOC: NEGATIVE
REASON FOR STUDY: NORMAL
TEMPUS COHORT SPECIFIC GENOME WIDE LOH THRESHOLD: 17 %
TEMPUS FUSIONADDENDUM: NORMAL
TEMPUS GENOME - WIDE LOH: 25.7 %
TEMPUS HRD ANALYSIS TYPE: NORMAL
TEMPUS HRD INTERPRETATION: POSITIVE
TEMPUS LCA: NORMAL
TEMPUS PD-L1 (22C3) COMBINED POSITIVE SCORE: <1
TEMPUS PD-L1 (22C3) TUMOR PROPORTION SCORE: <1 %
TEMPUS PORTAL: NORMAL
TEMPUS TRIALCOUNT: 1
TEMPUS TRIALMATCHES1: NORMAL

## 2024-05-06 LAB — SURGICAL PATHOLOGY REPORT: NORMAL

## 2024-05-08 ENCOUNTER — HOSPITAL ENCOUNTER (OUTPATIENT)
Dept: INFUSION THERAPY | Age: 79
Discharge: HOME | End: 2024-05-08
Payer: MEDICARE

## 2024-05-08 ENCOUNTER — TELEPHONE (OUTPATIENT)
Dept: GYNECOLOGIC ONCOLOGY | Age: 79
End: 2024-05-08

## 2024-05-08 VITALS
DIASTOLIC BLOOD PRESSURE: 63 MMHG | TEMPERATURE: 98.2 F | BODY MASS INDEX: 23.13 KG/M2 | SYSTOLIC BLOOD PRESSURE: 151 MMHG | WEIGHT: 139 LBS | RESPIRATION RATE: 18 BRPM | HEART RATE: 57 BPM

## 2024-05-08 DIAGNOSIS — Z51.11 CHEMOTHERAPY MANAGEMENT, ENCOUNTER FOR: ICD-10-CM

## 2024-05-08 DIAGNOSIS — C56.1 MALIGNANT NEOPLASM OF RIGHT OVARY (HCC): ICD-10-CM

## 2024-05-08 DIAGNOSIS — C48.2 CANCER OF PERITONEUM (HCC): Primary | ICD-10-CM

## 2024-05-08 LAB
ALBUMIN SERPL-MCNC: 4.1 G/DL (ref 3.5–5.2)
ALBUMIN/GLOB SERPL: 1.5 {RATIO} (ref 1–2.5)
ALP SERPL-CCNC: 105 U/L (ref 35–104)
ALT SERPL-CCNC: 13 U/L (ref 5–33)
ANION GAP SERPL CALCULATED.3IONS-SCNC: 13 MMOL/L (ref 9–17)
AST SERPL-CCNC: 15 U/L
BASOPHILS # BLD: 0 K/UL (ref 0–0.2)
BASOPHILS NFR BLD: 0 % (ref 0–2)
BILIRUB SERPL-MCNC: 0.4 MG/DL (ref 0.3–1.2)
BUN SERPL-MCNC: 17 MG/DL (ref 8–23)
CALCIUM SERPL-MCNC: 9.6 MG/DL (ref 8.6–10.4)
CANCER AG125 SERPL-ACNC: 33 U/ML (ref 0–38)
CHLORIDE SERPL-SCNC: 107 MMOL/L (ref 98–107)
CO2 SERPL-SCNC: 24 MMOL/L (ref 20–31)
CREAT SERPL-MCNC: 0.9 MG/DL (ref 0.5–0.9)
EOSINOPHIL # BLD: 0 K/UL (ref 0–0.4)
EOSINOPHILS RELATIVE PERCENT: 0 % (ref 1–4)
ERYTHROCYTE [DISTWIDTH] IN BLOOD BY AUTOMATED COUNT: 16.4 % (ref 12.5–15.4)
GFR, ESTIMATED: 65 ML/MIN/1.73M2
GLUCOSE SERPL-MCNC: 265 MG/DL (ref 70–99)
HCT VFR BLD AUTO: 37.9 % (ref 36–46)
HGB BLD-MCNC: 12.8 G/DL (ref 12–16)
LYMPHOCYTES NFR BLD: 1 K/UL (ref 1–4.8)
LYMPHOCYTES RELATIVE PERCENT: 13 % (ref 24–44)
MCH RBC QN AUTO: 31.1 PG (ref 26–34)
MCHC RBC AUTO-ENTMCNC: 33.8 G/DL (ref 31–37)
MCV RBC AUTO: 92.1 FL (ref 80–100)
MONOCYTES NFR BLD: 0.1 K/UL (ref 0.1–1.2)
MONOCYTES NFR BLD: 1 % (ref 2–11)
NEUTROPHILS NFR BLD: 86 % (ref 36–66)
NEUTS SEG NFR BLD: 6.2 K/UL (ref 1.8–7.7)
PLATELET # BLD AUTO: 246 K/UL (ref 140–450)
PMV BLD AUTO: 9.6 FL (ref 6–12)
POTASSIUM SERPL-SCNC: 4 MMOL/L (ref 3.7–5.3)
PROT SERPL-MCNC: 6.8 G/DL (ref 6.4–8.3)
RBC # BLD AUTO: 4.12 M/UL (ref 4–5.2)
SODIUM SERPL-SCNC: 144 MMOL/L (ref 135–144)
WBC OTHER # BLD: 7.3 K/UL (ref 3.5–11)

## 2024-05-08 PROCEDURE — 2580000003 HC RX 258: Performed by: INTERNAL MEDICINE

## 2024-05-08 PROCEDURE — 85025 COMPLETE CBC W/AUTO DIFF WBC: CPT

## 2024-05-08 PROCEDURE — 96413 CHEMO IV INFUSION 1 HR: CPT

## 2024-05-08 PROCEDURE — 6360000002 HC RX W HCPCS: Performed by: INTERNAL MEDICINE

## 2024-05-08 PROCEDURE — 96417 CHEMO IV INFUS EACH ADDL SEQ: CPT

## 2024-05-08 PROCEDURE — 2500000003 HC RX 250 WO HCPCS: Performed by: INTERNAL MEDICINE

## 2024-05-08 PROCEDURE — 96375 TX/PRO/DX INJ NEW DRUG ADDON: CPT

## 2024-05-08 PROCEDURE — 80053 COMPREHEN METABOLIC PANEL: CPT

## 2024-05-08 PROCEDURE — 96376 TX/PRO/DX INJ SAME DRUG ADON: CPT

## 2024-05-08 PROCEDURE — 96415 CHEMO IV INFUSION ADDL HR: CPT

## 2024-05-08 PROCEDURE — 86304 IMMUNOASSAY TUMOR CA 125: CPT

## 2024-05-08 PROCEDURE — 36591 DRAW BLOOD OFF VENOUS DEVICE: CPT

## 2024-05-08 RX ORDER — FAMOTIDINE 10 MG/ML
20 INJECTION, SOLUTION INTRAVENOUS ONCE
Status: COMPLETED | OUTPATIENT
Start: 2024-05-08 | End: 2024-05-08

## 2024-05-08 RX ORDER — PALONOSETRON 0.05 MG/ML
0.25 INJECTION, SOLUTION INTRAVENOUS ONCE
Status: COMPLETED | OUTPATIENT
Start: 2024-05-08 | End: 2024-05-08

## 2024-05-08 RX ORDER — SODIUM CHLORIDE 0.9 % (FLUSH) 0.9 %
5-40 SYRINGE (ML) INJECTION PRN
Status: DISCONTINUED | OUTPATIENT
Start: 2024-05-08 | End: 2024-05-09 | Stop reason: HOSPADM

## 2024-05-08 RX ORDER — HEPARIN 100 UNIT/ML
500 SYRINGE INTRAVENOUS PRN
Status: DISCONTINUED | OUTPATIENT
Start: 2024-05-08 | End: 2024-05-09 | Stop reason: HOSPADM

## 2024-05-08 RX ORDER — DIPHENHYDRAMINE HYDROCHLORIDE 50 MG/ML
50 INJECTION INTRAMUSCULAR; INTRAVENOUS ONCE
Status: COMPLETED | OUTPATIENT
Start: 2024-05-08 | End: 2024-05-08

## 2024-05-08 RX ORDER — DEXAMETHASONE SODIUM PHOSPHATE 10 MG/ML
10 INJECTION INTRAMUSCULAR; INTRAVENOUS ONCE
Status: COMPLETED | OUTPATIENT
Start: 2024-05-08 | End: 2024-05-08

## 2024-05-08 RX ORDER — SODIUM CHLORIDE 9 MG/ML
5-250 INJECTION, SOLUTION INTRAVENOUS PRN
Status: DISCONTINUED | OUTPATIENT
Start: 2024-05-08 | End: 2024-05-09 | Stop reason: HOSPADM

## 2024-05-08 RX ADMIN — SODIUM CHLORIDE 150 ML/HR: 9 INJECTION, SOLUTION INTRAVENOUS at 09:57

## 2024-05-08 RX ADMIN — PACLITAXEL 222 MG: 6 INJECTION, SOLUTION INTRAVENOUS at 10:50

## 2024-05-08 RX ADMIN — DEXAMETHASONE SODIUM PHOSPHATE 10 MG: 10 INJECTION INTRAMUSCULAR; INTRAVENOUS at 10:02

## 2024-05-08 RX ADMIN — CARBOPLATIN 300 MG: 10 INJECTION INTRAVENOUS at 13:44

## 2024-05-08 RX ADMIN — PALONOSETRON 0.25 MG: 0.05 INJECTION, SOLUTION INTRAVENOUS at 09:57

## 2024-05-08 RX ADMIN — DIPHENHYDRAMINE HYDROCHLORIDE 50 MG: 50 INJECTION, SOLUTION INTRAMUSCULAR; INTRAVENOUS at 10:00

## 2024-05-08 RX ADMIN — FAMOTIDINE 20 MG: 10 INJECTION, SOLUTION INTRAVENOUS at 09:58

## 2024-05-08 RX ADMIN — Medication 500 UNITS: at 14:20

## 2024-05-08 RX ADMIN — SODIUM CHLORIDE, PRESERVATIVE FREE 10 ML: 5 INJECTION INTRAVENOUS at 14:19

## 2024-05-08 RX ADMIN — SODIUM CHLORIDE 150 MG: 9 INJECTION, SOLUTION INTRAVENOUS at 10:16

## 2024-05-08 NOTE — TELEPHONE ENCOUNTER
Natalia was calling for her mom ,she was in Chemo at the time of the call.    Melissa saw Dr Brown and she states that he talked to Dr. Patrick regarding her case.on 5/3/24 but told patient to do a follow up call to office. Patient is coming up on her 4th round of chemo and is not sure if she is to see Dr. Patrick please follow up with patient.    Writer advised patient daughter that Dr. Patrick is in surgery and would route the message to clinical for review.    Caller asked that the return call be made to Melissa.

## 2024-05-08 NOTE — TELEPHONE ENCOUNTER
Called and spoke to patient. She is scheduled to see Dr. Patrick on 5/10/24 @ 9:00 am in [place]. Patient was agreeable.

## 2024-05-08 NOTE — PROGRESS NOTES
Pt here for C4D1 Carbo, Taxol.  Labs reviewed.   Infusion completed without incident.  Pt to return 5/29/24.  Pt discharged.

## 2024-05-09 PROBLEM — I47.29 NSVT (NONSUSTAINED VENTRICULAR TACHYCARDIA) (HCC): Status: ACTIVE | Noted: 2018-10-03

## 2024-05-09 PROBLEM — Z87.891 FORMER CIGARETTE SMOKER: Status: ACTIVE | Noted: 2019-06-12

## 2024-05-09 PROBLEM — I49.3 PVC'S (PREMATURE VENTRICULAR CONTRACTIONS): Status: ACTIVE | Noted: 2018-10-03

## 2024-05-09 PROBLEM — M16.11 OSTEOARTHRITIS OF RIGHT HIP: Status: ACTIVE | Noted: 2022-05-16

## 2024-05-09 PROBLEM — I49.1 PREMATURE ATRIAL CONTRACTION: Status: ACTIVE | Noted: 2018-10-03

## 2024-05-09 PROBLEM — N94.89 OTHER SPECIFIED CONDITIONS ASSOCIATED WITH FEMALE GENITAL ORGANS AND MENSTRUAL CYCLE: Status: ACTIVE | Noted: 2024-01-25

## 2024-05-09 PROBLEM — N95.0 POSTMENOPAUSAL BLEEDING: Status: ACTIVE | Noted: 2024-05-09

## 2024-05-09 PROBLEM — I47.19 PAT (PAROXYSMAL ATRIAL TACHYCARDIA) (HCC): Status: ACTIVE | Noted: 2020-12-23

## 2024-05-09 PROBLEM — E78.5 HYPERLIPIDEMIA: Status: ACTIVE | Noted: 2018-10-03

## 2024-05-09 PROBLEM — R19.00 PELVIC MASS: Status: ACTIVE | Noted: 2024-01-25

## 2024-05-09 PROBLEM — H90.3 SENSORINEURAL HEARING LOSS (SNHL) OF BOTH EARS: Status: ACTIVE | Noted: 2017-02-02

## 2024-05-09 PROBLEM — E55.9 VITAMIN D DEFICIENCY: Status: ACTIVE | Noted: 2024-05-09

## 2024-05-09 PROBLEM — R00.2 INTERMITTENT PALPITATIONS: Status: ACTIVE | Noted: 2019-04-10

## 2024-05-09 PROBLEM — I10 PRIMARY HYPERTENSION: Status: ACTIVE | Noted: 2018-10-03

## 2024-05-09 PROBLEM — M15.9 GENERALIZED OSTEOARTHRITIS: Status: ACTIVE | Noted: 2024-05-09

## 2024-05-09 PROBLEM — R42 LIGHTHEADEDNESS: Status: ACTIVE | Noted: 2019-06-12

## 2024-05-10 ENCOUNTER — TELEPHONE (OUTPATIENT)
Dept: GYNECOLOGIC ONCOLOGY | Age: 79
End: 2024-05-10

## 2024-05-10 ENCOUNTER — TELEPHONE (OUTPATIENT)
Dept: ONCOLOGY | Age: 79
End: 2024-05-10

## 2024-05-10 ENCOUNTER — OFFICE VISIT (OUTPATIENT)
Dept: GYNECOLOGIC ONCOLOGY | Age: 79
End: 2024-05-10

## 2024-05-10 VITALS
DIASTOLIC BLOOD PRESSURE: 75 MMHG | HEART RATE: 82 BPM | TEMPERATURE: 97 F | SYSTOLIC BLOOD PRESSURE: 148 MMHG | BODY MASS INDEX: 23.9 KG/M2 | OXYGEN SATURATION: 98 % | WEIGHT: 143.6 LBS

## 2024-05-10 DIAGNOSIS — I26.99 PULMONARY EMBOLISM, OTHER, UNSPECIFIED CHRONICITY, UNSPECIFIED WHETHER ACUTE COR PULMONALE PRESENT (HCC): ICD-10-CM

## 2024-05-10 DIAGNOSIS — R19.00 PELVIC MASS: ICD-10-CM

## 2024-05-10 DIAGNOSIS — Z28.21 TETANUS, DIPHTHERIA, AND ACELLULAR PERTUSSIS (TDAP) VACCINATION DECLINED: ICD-10-CM

## 2024-05-10 DIAGNOSIS — C57.9 MALIGNANT NEOPLASM OF FEMALE GENITAL ORGAN (HCC): Primary | ICD-10-CM

## 2024-05-10 DIAGNOSIS — Z28.21 PNEUMOCOCCAL VACCINATION DECLINED: ICD-10-CM

## 2024-05-10 DIAGNOSIS — C78.6 PERITONEAL CARCINOMATOSIS (HCC): ICD-10-CM

## 2024-05-10 DIAGNOSIS — Z28.21 COVID-19 VIRUS ANTIGEN VACCINE DECLINED: ICD-10-CM

## 2024-05-10 DIAGNOSIS — Z28.21 INFLUENZA VACCINATION DECLINED: ICD-10-CM

## 2024-05-10 DIAGNOSIS — R41.3 MEMORY LOSS: ICD-10-CM

## 2024-05-10 NOTE — PROGRESS NOTES
[facility] Gynecologic Oncology  [address] [place] [address], [address]  [place] [address]    GYNECOLOGIC ONCOLOGY   FOLLOW UP NOTE     PATIENT NAME:  Melissa Bo  MRN:                     [id number]  DATE:                    05/10/2024     REASON FOR VISIT:      Suspicion for gynecologic malignancy  On treatment follow up  Consideration for interval debulking procedure      HISTORY OF PRESENT ILLNESS:      Melissa Bo is an 77yo with symptoms of pelvic pressure and post-menopausal bleeding. CT-scan AP demonstrates 3cm deep liver lesion, peritoneal carcinomatosis (largest peritoneal nodule 1cm), 15cm pelvic mass, small amount ascites (01/26/24). Pathology results from the FNA of the omental / peritoneal nodule  is consistent with metastatic carcinoma, consistent with Mullerian origin (02/12/24). She has completed 4 cycles Carboplatin + Taxol (C4: 05/08/24), under the care of Dr. Sergio Brown. She has required IVF hydration 1 week after each chemotherapy cycle.  (02/16/24): 388 (H) => 33 (normal) (05/08/24). She presents to the office today, accompanied  (Chris, 91yo) and her daughter on face time (Natalia).    The patient reports to overall feel well. She reports to experience symptoms of daily dizzyness, worse with laying to standing. She reports to have found a cane at home and uses this for gait stability. She reports to have forgotten this at home and did not bring the cane to her appointment today. She denies to have experienced any falls or injuries.     She reports to tolerate a regular diet, with symptoms of constipation. She reports to experience improved symptoms of anorexia and abdominal bloating. She denies to have any symptoms of nausea. She now adamantly expresses to experience symptoms of vaginal spotting all along. She denies to have experienced any symptoms of rectal bleeding (where as on prior visits she had casually mentioned experiencing symptoms of intermittent

## 2024-05-10 NOTE — TELEPHONE ENCOUNTER
Name: Melissa Bo  : 1945  MRN: [id number]    Oncology Navigation Follow-Up Note    Contact Type:  Telephone    Notes: Dr. Celina Adkins's nurse, called stating pt completed f/u & referral placed to [facility] gyn onc.  Will continue to follow.      Electronically signed by Kim Rainey RN on 5/10/2024 at 10:47 AM

## 2024-05-10 NOTE — TELEPHONE ENCOUNTER
Called and spoke with cardiology office.  Informed office patient needs an appointment for cardiac optimization and risk stratification.  Office to call patient and schedule appointment.

## 2024-05-10 NOTE — PROGRESS NOTES
Review of Systems   Constitutional:  Positive for appetite change (appetite improving). Negative for chills, diaphoresis, fatigue, fever and unexpected weight change.   HENT:   Negative for hearing loss, lump/mass, mouth sores, nosebleeds, sore throat, tinnitus, trouble swallowing and voice change.    Eyes:  Positive for eye problems (Problems focusing). Negative for icterus.   Respiratory:  Negative for chest tightness, cough, hemoptysis, shortness of breath and wheezing.    Cardiovascular:  Negative for chest pain, leg swelling and palpitations.   Gastrointestinal:  Positive for constipation. Negative for abdominal distention, abdominal pain, blood in stool, diarrhea, nausea, rectal pain and vomiting.   Endocrine: Negative for hot flashes.   Genitourinary:  Positive for vaginal discharge (yellow discharge). Negative for bladder incontinence, difficulty urinating, dyspareunia, dysuria, frequency, hematuria, menstrual problem, nocturia, pelvic pain and vaginal bleeding.    Musculoskeletal:  Positive for gait problem (unsteady at times). Negative for arthralgias, back pain, flank pain, myalgias, neck pain and neck stiffness.   Skin:  Negative for itching, rash and wound.   Neurological:  Positive for dizziness, extremity weakness (weakness in legs) and gait problem (unsteady at times). Negative for headaches, light-headedness, numbness, seizures and speech difficulty.   Hematological:  Negative for adenopathy. Bruises/bleeds easily (blood thinner therapy).   Psychiatric/Behavioral:  Negative for confusion, decreased concentration, depression, sleep disturbance and suicidal ideas. The patient is not nervous/anxious.

## 2024-05-10 NOTE — PROGRESS NOTES
[facility] Gynecologic Oncology  [address], [facility] [address]  [place] [address]    PATIENT NAME:  Melissa Bo  MRN:                     [id number]  DATE OF SERVICE: 05/10/2024    !! NEEDS CARDIAC CLEARANCE !!  !! NEEDS DOPPLER US LE !!    !! NEED Tempus xG+ results !!            *** PARTIALLY PREPPED ***  ... notes, care everywhere ...        CHART PREP FROM 02/21/24 TO PRESENT (Included here to reference during visit)      Carboplatin + Taxol (C1: 03/06/24. C2: 03/27/24. C3: 04/03/24. C4: 05/08/24)    Medi-port placement (02/28/24)    Visit note, Dr. Sergio Brown (02/23/24 to 05/03/24)     (02/16/24): 388 (H) => 71 (H) (04/17/24) => 33 (05/08/24)    CMP (05/08/24): Glucose 265 (H). Normal Creatinine 0.9  CBC (05/08/24): Hemoglobin 12.8      CT-scan CAP (IV contrast) (04/25/24)  Pulmonary artery filling defects involving right lower lobe pulmonary arterial branches consistent with pulmonary embolus. There are no specific findings of right heart strain.  Thoracic and Lumbar dextroscoliosis is noted  Right IJ chest port terminates at the SVC level  Left parapelvic renal cysts are noted. There is a 1.9cm upper pole right renal cyst  No ascites  No pathologic adenopathy is noted   7x9.5x6.1cm multilobulated mass in the right hemipelvis with areas of cystic change, septation and nodularity along the walls consistent with known ovarian neoplasm. (previously measured 15.3x10.6x9.8cm)  The lesion is inseparable from adjacent rectosigmoid colon. Colonic invasion cannot be excluded.  3.4cm cystic left adnexal lesion of indeterminate nature  Sigmoid diverticulosis is noted  Lateral to the rectum on the right is a 1.7cm soft tissue nodule. This could reflect peritoneal metastasis.  Left hip prosthesis.    Degenerative change of the lumbar spine and right hip is noted.      Tempus xF (04/19/24) (collected 04/17/24): 105 gene liquid biopsy  Biologically Relevant:   TP53 (p.G244C, missense variant,

## 2024-05-13 ENCOUNTER — TELEPHONE (OUTPATIENT)
Dept: GYNECOLOGIC ONCOLOGY | Age: 79
End: 2024-05-13

## 2024-05-13 DIAGNOSIS — Z13.79 GENETIC TESTING: Primary | ICD-10-CM

## 2024-05-13 DIAGNOSIS — C48.2 CANCER OF PERITONEUM (HCC): ICD-10-CM

## 2024-05-13 NOTE — TELEPHONE ENCOUNTER
Called and informed patient that genetic testing ordered through [facility].  Patient to get blood drawn this week.  No out of pocket cost to patient.

## 2024-05-13 NOTE — TELEPHONE ENCOUNTER
Called [facility] to follow up on referral.  Patient not scheduled at this time.  Will continue to follow.

## 2024-05-14 DIAGNOSIS — C56.1 MALIGNANT NEOPLASM OF RIGHT OVARY (HCC): Primary | ICD-10-CM

## 2024-05-14 DIAGNOSIS — C56.1 MALIGNANT NEOPLASM OF RIGHT OVARY (HCC): ICD-10-CM

## 2024-05-14 DIAGNOSIS — Z51.11 CHEMOTHERAPY MANAGEMENT, ENCOUNTER FOR: ICD-10-CM

## 2024-05-14 DIAGNOSIS — Z51.11 CHEMOTHERAPY MANAGEMENT, ENCOUNTER FOR: Primary | ICD-10-CM

## 2024-05-14 RX ORDER — HYDROCODONE BITARTRATE AND ACETAMINOPHEN 5; 325 MG/1; MG/1
1 TABLET ORAL EVERY 6 HOURS PRN
Qty: 45 TABLET | Refills: 0 | Status: CANCELLED | OUTPATIENT
Start: 2024-05-14 | End: 2024-05-29

## 2024-05-15 ENCOUNTER — TELEPHONE (OUTPATIENT)
Dept: ONCOLOGY | Age: 79
End: 2024-05-15

## 2024-05-15 RX ORDER — HYDROCODONE BITARTRATE AND ACETAMINOPHEN 5; 325 MG/1; MG/1
1 TABLET ORAL EVERY 6 HOURS PRN
Qty: 45 TABLET | Refills: 0 | Status: SHIPPED | OUTPATIENT
Start: 2024-05-15 | End: 2024-05-30

## 2024-05-15 NOTE — TELEPHONE ENCOUNTER
Name: Melissa Bo  : 1945  MRN: [id number]    Oncology Navigation Follow-Up Note    Contact Type:  Telephone    Notes:LATE ENTRY: 5/10 Pt called in stating read side effects to omeprazole noted dizziness & fatigue potential side effects.  Pt c/o dizziness & fatigue.  Pt requested writer update Dr. Brown to inquire on continuing  Notified pt Dr. Brown out of office until 5/15.  Encouraged pt to continue omeprazole & instructed ordered to decrease acid production during chemotherapy txs.  Pt verbalized understanding.  Instructed pt writer will update Dr. Brown upon return to office.    5/15  Dr. Brown updated on pt's inquiry.  Dr. Brown stated pt to continue omeprazole & stated dizziness/fatigue side effects of tx.  Pt updated on Dr. Brown's response.  Pt stated did not stop omeprazole & will continue.  Pt stated scheduled for Dr. Dodson, cardiology, f/u .  Inquired on [facility] referral placed by Dr. Patrick.  Pt stated did not receive call to schedule.  Pt denied questions/concerns.  Encouraged to contact writer prn.  Will continue to follow.      Electronically signed by Kim Rainey RN on 5/15/2024 at 9:59 AM

## 2024-05-16 ENCOUNTER — TELEPHONE (OUTPATIENT)
Dept: ONCOLOGY | Age: 79
End: 2024-05-16

## 2024-05-16 ENCOUNTER — TELEPHONE (OUTPATIENT)
Dept: GYNECOLOGIC ONCOLOGY | Age: 79
End: 2024-05-16

## 2024-05-16 NOTE — TELEPHONE ENCOUNTER
Edwina called wanting to talk with Lucille regarding this test.Temrussus XG+. They wants this test cancelled. She stated she would give reason when you call.

## 2024-05-16 NOTE — TELEPHONE ENCOUNTER
Name: Melissa Bo  : 1945  MRN: [id number]    Oncology Navigation Follow-Up Note    Contact Type:  Telephone    Notes: Pt's daughter, Edwina, called in stating noted on My Chart tempus xG ordered.  Edwina inquired if xG genetic testing.  Instructed Edwina tempus xG gentic testing & tempus xT NGS testing on tumor.  Edwina stated pt does not want genetic testing completed r/t possible positive results leading to fear for grand daughters & cost.  Discussed genetic testing may also have tx indications & notified no out of pocket cost.  Edwina stated pt does not wish to proceed w/genetic testing @ this time.  Edwina stated pt unsure if will proceed w/further tx & will discuss further w/Dr. Brown @  appt.  Support given & encouraged to contact Dr. Patrick's office to update on pt's wishes.  Edwina stated message left this am.  Encouraged Edwina to contact writer prn.  Will continue to follow.      Electronically signed by Kim Rainey RN on 2024 at 10:50 AM

## 2024-05-16 NOTE — TELEPHONE ENCOUNTER
Called daughter Edwina back regarding TEMPUS xG+.  Educated daughter on reason for genetic testing.  Daughter states patient does not want genetic testing.  Writer voiced understanding but would need to discuss this with patient as daughter is not decision maker at this time.  Daughter contacted patient, writer informed patient of reason for genetic testing, patient continues to not want genetic testing.  Surgeon updated, order discontinued.

## 2024-05-20 ENCOUNTER — TELEPHONE (OUTPATIENT)
Dept: GYNECOLOGIC ONCOLOGY | Age: 79
End: 2024-05-20

## 2024-05-20 ENCOUNTER — HOSPITAL ENCOUNTER (OUTPATIENT)
Dept: VASCULAR LAB | Age: 79
Discharge: HOME | End: 2024-05-22
Attending: OBSTETRICS & GYNECOLOGY
Payer: MEDICARE

## 2024-05-20 DIAGNOSIS — Z51.11 CHEMOTHERAPY MANAGEMENT, ENCOUNTER FOR: ICD-10-CM

## 2024-05-20 DIAGNOSIS — C56.1 MALIGNANT NEOPLASM OF RIGHT OVARY (HCC): ICD-10-CM

## 2024-05-20 DIAGNOSIS — I26.99 PULMONARY EMBOLISM, OTHER, UNSPECIFIED CHRONICITY, UNSPECIFIED WHETHER ACUTE COR PULMONALE PRESENT (HCC): ICD-10-CM

## 2024-05-20 PROCEDURE — 93970 EXTREMITY STUDY: CPT | Performed by: STUDENT IN AN ORGANIZED HEALTH CARE EDUCATION/TRAINING PROGRAM

## 2024-05-20 PROCEDURE — 93970 EXTREMITY STUDY: CPT

## 2024-05-20 NOTE — TELEPHONE ENCOUNTER
Daughter Edwina called and informed writer that patient and daughter have been researching cancer treatments and wondered if Dr. Patrick would order Ivermectin along with the chemo.  Writer informed daughter that that would be a better question suited to medical oncology since they are controlling the chemo treatment.  Main phone number to [facility] provided to daughter.  Daughter voiced understanding and appreciation.

## 2024-05-20 NOTE — TELEPHONE ENCOUNTER
Called patient to determine if [facility] has called and if patient still interested in referral.  Patient denies having received any calls and confirms that she would appreciate 2nd opinion.  Writer called [facility] and was informed referral not received.  Referral has been faxed twice over last 10 days.  Writer obtained alternate fax number and re faxed referral to original fax number and alternate fax number.  Will continue to monitor referral.

## 2024-05-21 ENCOUNTER — TELEPHONE (OUTPATIENT)
Dept: ONCOLOGY | Age: 79
End: 2024-05-21

## 2024-05-21 RX ORDER — METOPROLOL SUCCINATE 50 MG/1
50 TABLET, EXTENDED RELEASE ORAL DAILY
Qty: 30 TABLET | Refills: 0 | Status: SHIPPED | OUTPATIENT
Start: 2024-05-21

## 2024-05-21 NOTE — TELEPHONE ENCOUNTER
Name: Melissa Bo  : 1945  MRN: [id number]    Oncology Navigation Follow-Up Note    Contact Type:  Telephone    Notes: Pt called in stating scheduled for Dr. Basilio, [facility], .  Pt stated instructed to request records sent.  Notified pt [facility] will retreive records via [facility] everywhere & writer will request imaging sent electronically.  Pt verbalized understanding & denied questions/concerns.  Will continue to follow.      Electronically signed by Kim Rainey RN on 2024 at 4:02 PM

## 2024-05-22 NOTE — RESULT ENCOUNTER NOTE
Please inform the patient regarding the normal lower extremity ultrasound results. There is no evidence of a DVT.    I have noted the [facility] appointment scheduled for 06/06/24.    I have noted that the patient has declined genetic testing.    I would recommend against Ivermectin use as there is no medical data to support this treatment with her diagnosis.    Thank you for your assistance,  Sheryl Patrick MD  Gynecologic Oncology

## 2024-05-23 ENCOUNTER — TELEPHONE (OUTPATIENT)
Dept: INFUSION THERAPY | Age: 79
End: 2024-05-23

## 2024-05-23 NOTE — RESULT ENCOUNTER NOTE
Called and spoke to patient regarding normal results. Patient voiced understanding and [facility]

## 2024-05-23 NOTE — TELEPHONE ENCOUNTER
Pt's daughter, Edwina, called stating they would like pt to be prescribed Ivermectin. She states they have done a lot of research, and it shows this could be beneficial for pt's on chemotherapy. Spoke with Dr. Brown and he states he will not be prescribing this. Edwina notified and states she will speak to Dr. Brown more about this at the next f/u.

## 2024-05-25 NOTE — PROGRESS NOTES
Review of Systems   Constitutional: Negative.    HENT:  Negative.     Eyes: Negative.    Respiratory: Negative.     Cardiovascular: Negative.    Gastrointestinal:  Positive for rectal pain (\"a little blood\").   Endocrine: Negative.    Genitourinary:  Positive for vaginal discharge (constant).    Musculoskeletal: Negative.    Skin: Negative.    Neurological:  Positive for light-headedness (once in a while).   Hematological:  Bruises/bleeds easily.      
Carboplatin + Taxol. I have advised against Avastin use in this patient, given the extent of bowel involvement and symptoms of rectal bleeding. The schedule of the regimen has been briefly discussed today. Side effects of chemotherapy including but not limited to nausea, vomiting, alopecia, heart failure, liver and kidney dysfunction, fatigue, myelosuppression (which means decrease of white blood cells, hemoglobin and platelets), increased risk of infections, hospitalization, fatal anaphylaxis, rash, neuropathy, ototoxicity, sepsis and even death were discussed with patient. The patient agrees to proceeds and understands the risks as well benefits of chemotherapy treatment. The patient has been advised that the chemotherapy will be administered by Dr. Sergio Brown and a referral has been placed today.  To expedite the patient's care, the following orders have been placed: Each order request has been explained to the patient and her family, along with the rationale for the request  NGS testing of FNA samoke (HRD, MSH, TMB testing, Actionable genetic mutations and Germline genetic mutation testing)  Port placement by Interventional Radiology  I have personally discussed this patient and her plan of care with Dr. Sergio Brown   We have discussed the importance of leaving the Avastin out of the treatment plan for this patient  We discussed the importance of obtained a  level with each cycle of treatment as this will be one of the factor to decide timing of interval debulking  We discussed timing of next CT-scan CAP to be completed with cycle 3   I will plan to see the patient again to discuss interval debulking in more detail, obtain consent and pre-operative risk stratification / evaluation between cycle 2 and cycle 3.  I will plan to video visit with the patient once the CT-scan CAP is completed to finalize a timing for the interval debulking 1 week prior to the procedure  Reassurance, supportive and emotional 
Abdomen soft, nontender, nondistended, bowel sounds present in all 4 quadrants.

## 2024-05-26 PROBLEM — Z28.21 TETANUS, DIPHTHERIA, AND ACELLULAR PERTUSSIS (TDAP) VACCINATION DECLINED: Status: ACTIVE | Noted: 2024-05-26

## 2024-05-26 PROBLEM — Z28.21: Status: ACTIVE | Noted: 2024-05-26

## 2024-05-26 PROBLEM — C57.9: Status: ACTIVE | Noted: 2024-05-26

## 2024-05-26 PROBLEM — C78.6 PERITONEAL CARCINOMATOSIS (HCC): Status: ACTIVE | Noted: 2024-05-26

## 2024-05-26 PROBLEM — R41.3 MEMORY LOSS: Status: ACTIVE | Noted: 2024-05-26

## 2024-05-26 PROBLEM — I26.99 PULMONARY EMBOLISM (HCC): Status: ACTIVE | Noted: 2024-05-26

## 2024-05-26 PROBLEM — Z28.21 PNEUMOCOCCAL VACCINATION DECLINED: Status: ACTIVE | Noted: 2024-05-26

## 2024-05-26 PROBLEM — Z28.21 INFLUENZA VACCINATION DECLINED: Status: ACTIVE | Noted: 2024-05-26

## 2024-05-28 ENCOUNTER — TELEPHONE (OUTPATIENT)
Dept: ONCOLOGY | Age: 79
End: 2024-05-28

## 2024-05-28 NOTE — TELEPHONE ENCOUNTER
05/09/2024    Postmenopausal bleeding 05/09/2024    Vitamin D deficiency 05/09/2024    Cancer of peritoneum (HCC) 02/23/2024    Other specified conditions associated with female genital organs and menstrual cycle 01/25/2024    Pelvic mass 01/25/2024    Osteoarthritis of right hip 05/16/2022    PAT (paroxysmal atrial tachycardia) ([facility]) 12/23/2020    Cyst of skin 09/03/2019    Former cigarette smoker 06/12/2019    Lightheadedness 06/12/2019    Intermittent palpitations 04/10/2019    Hyperlipidemia 10/03/2018    NSVT (nonsustained ventricular tachycardia) (HCC) 10/03/2018    Premature atrial contraction 10/03/2018    Primary hypertension 10/03/2018    PVC's (premature ventricular contractions) 10/03/2018    Sensorineural hearing loss (SNHL) of both ears 02/02/2017     Anthropometric Measures:  Height: 5' 5\"  Weight: 143 lb 9.6 oz (65.1 kg)    Ideal Body Weight: 125 lb (56.8 kg)  BMI: 23.90 kg/m2 (Normal Weight)   Weight Change: h/o 8% loss in 2.5 months    Wt Readings from Last 20 Encounters:   05/10/24 65.1 kg (143 lb 9.6 oz)   05/08/24 63 kg (139 lb)   05/03/24 63.7 kg (140 lb 6.4 oz)   04/17/24 64.2 kg (141 lb 9.6 oz)   03/27/24 66.3 kg (146 lb 3.2 oz)   03/06/24 68.9 kg (151 lb 14.4 oz)   02/23/24 69 kg (152 lb 1.6 oz)   02/16/24 69.9 kg (154 lb)   02/12/24 69.4 kg (153 lb)   02/05/24 69.4 kg (153 lb)   09/03/19 72.6 kg (160 lb)     Estimated Nutrition Needs:  Estimated Calorie Needs: 1800 kcal/day   Estimated Protein Needs:80 g pro/day   Estimated Fluid Needs: 1800 mL/day     Current Nutrition Regimen:  Oral Diet: Regular      Nutrition Diagnosis and Goal  Problem: Unintentional weight loss  Etiology/related to: sub-optimal energy intake   Symptoms/Signs/as evidenced by: h/o 8% weight loss over 2.5 months       Goal: Adequate nutrient intake for weight maintenance and to optimize nutrition status while undergoing cancer treatment  Progress towards goal: progressing         GUILLERMO EUCEDA, CB, LD

## 2024-05-29 ENCOUNTER — OFFICE VISIT (OUTPATIENT)
Dept: ONCOLOGY | Age: 79
End: 2024-05-29
Payer: MEDICARE

## 2024-05-29 ENCOUNTER — TELEPHONE (OUTPATIENT)
Dept: ONCOLOGY | Age: 79
End: 2024-05-29

## 2024-05-29 ENCOUNTER — HOSPITAL ENCOUNTER (OUTPATIENT)
Dept: INFUSION THERAPY | Age: 79
Discharge: HOME | End: 2024-05-29
Payer: MEDICARE

## 2024-05-29 VITALS
DIASTOLIC BLOOD PRESSURE: 89 MMHG | HEART RATE: 54 BPM | SYSTOLIC BLOOD PRESSURE: 139 MMHG | WEIGHT: 134 LBS | BODY MASS INDEX: 22.3 KG/M2 | TEMPERATURE: 95.7 F

## 2024-05-29 DIAGNOSIS — C56.1 MALIGNANT NEOPLASM OF RIGHT OVARY (HCC): ICD-10-CM

## 2024-05-29 DIAGNOSIS — Z51.11 CHEMOTHERAPY MANAGEMENT, ENCOUNTER FOR: ICD-10-CM

## 2024-05-29 DIAGNOSIS — C56.1 MALIGNANT NEOPLASM OF RIGHT OVARY (HCC): Primary | ICD-10-CM

## 2024-05-29 DIAGNOSIS — C48.2 CANCER OF PERITONEUM (HCC): Primary | ICD-10-CM

## 2024-05-29 LAB
ALBUMIN SERPL-MCNC: 3.9 G/DL (ref 3.5–5.2)
ALBUMIN/GLOB SERPL: 1.6 {RATIO} (ref 1–2.5)
ALP SERPL-CCNC: 94 U/L (ref 35–104)
ALT SERPL-CCNC: 13 U/L (ref 5–33)
ANION GAP SERPL CALCULATED.3IONS-SCNC: 12 MMOL/L (ref 9–17)
AST SERPL-CCNC: 15 U/L
BASOPHILS # BLD: 0.1 K/UL (ref 0–0.2)
BASOPHILS NFR BLD: 1 % (ref 0–2)
BILIRUB SERPL-MCNC: 0.3 MG/DL (ref 0.3–1.2)
BUN SERPL-MCNC: 12 MG/DL (ref 8–23)
CALCIUM SERPL-MCNC: 9.1 MG/DL (ref 8.6–10.4)
CHLORIDE SERPL-SCNC: 107 MMOL/L (ref 98–107)
CO2 SERPL-SCNC: 25 MMOL/L (ref 20–31)
CREAT SERPL-MCNC: 0.8 MG/DL (ref 0.5–0.9)
EOSINOPHIL # BLD: 0 K/UL (ref 0–0.4)
EOSINOPHILS RELATIVE PERCENT: 0 % (ref 1–4)
ERYTHROCYTE [DISTWIDTH] IN BLOOD BY AUTOMATED COUNT: 17.3 % (ref 12.5–15.4)
GFR, ESTIMATED: 74 ML/MIN/1.73M2
GLUCOSE SERPL-MCNC: 173 MG/DL (ref 70–99)
HCT VFR BLD AUTO: 38.6 % (ref 36–46)
HGB BLD-MCNC: 13 G/DL (ref 12–16)
LYMPHOCYTES NFR BLD: 1.6 K/UL (ref 1–4.8)
LYMPHOCYTES RELATIVE PERCENT: 20 % (ref 24–44)
MCH RBC QN AUTO: 31.6 PG (ref 26–34)
MCHC RBC AUTO-ENTMCNC: 33.6 G/DL (ref 31–37)
MCV RBC AUTO: 94 FL (ref 80–100)
MONOCYTES NFR BLD: 0.8 K/UL (ref 0.1–1.2)
MONOCYTES NFR BLD: 10 % (ref 2–11)
NEUTROPHILS NFR BLD: 69 % (ref 36–66)
NEUTS SEG NFR BLD: 5.5 K/UL (ref 1.8–7.7)
PLATELET # BLD AUTO: 237 K/UL (ref 140–450)
PMV BLD AUTO: 9.5 FL (ref 6–12)
POTASSIUM SERPL-SCNC: 3.8 MMOL/L (ref 3.7–5.3)
PROT SERPL-MCNC: 6.4 G/DL (ref 6.4–8.3)
RBC # BLD AUTO: 4.11 M/UL (ref 4–5.2)
SODIUM SERPL-SCNC: 144 MMOL/L (ref 135–144)
WBC OTHER # BLD: 8 K/UL (ref 3.5–11)

## 2024-05-29 PROCEDURE — 2580000003 HC RX 258: Performed by: INTERNAL MEDICINE

## 2024-05-29 PROCEDURE — 3079F DIAST BP 80-89 MM HG: CPT | Performed by: INTERNAL MEDICINE

## 2024-05-29 PROCEDURE — 85025 COMPLETE CBC W/AUTO DIFF WBC: CPT

## 2024-05-29 PROCEDURE — 80053 COMPREHEN METABOLIC PANEL: CPT

## 2024-05-29 PROCEDURE — 3075F SYST BP GE 130 - 139MM HG: CPT | Performed by: INTERNAL MEDICINE

## 2024-05-29 PROCEDURE — 6360000002 HC RX W HCPCS: Performed by: INTERNAL MEDICINE

## 2024-05-29 PROCEDURE — 36591 DRAW BLOOD OFF VENOUS DEVICE: CPT

## 2024-05-29 PROCEDURE — 99215 OFFICE O/P EST HI 40 MIN: CPT | Performed by: INTERNAL MEDICINE

## 2024-05-29 PROCEDURE — 1123F ACP DISCUSS/DSCN MKR DOCD: CPT | Performed by: INTERNAL MEDICINE

## 2024-05-29 RX ORDER — VITAMIN B COMPLEX
1 CAPSULE ORAL DAILY
Qty: 90 CAPSULE | Refills: 1 | Status: SHIPPED | OUTPATIENT
Start: 2024-05-29

## 2024-05-29 RX ORDER — SENNA AND DOCUSATE SODIUM 50; 8.6 MG/1; MG/1
1 TABLET, FILM COATED ORAL DAILY
Qty: 30 TABLET | Refills: 1 | Status: SHIPPED | OUTPATIENT
Start: 2024-05-29

## 2024-05-29 RX ORDER — SODIUM CHLORIDE 0.9 % (FLUSH) 0.9 %
5-40 SYRINGE (ML) INJECTION PRN
Status: DISCONTINUED | OUTPATIENT
Start: 2024-05-29 | End: 2024-05-30 | Stop reason: HOSPADM

## 2024-05-29 RX ORDER — HEPARIN 100 UNIT/ML
500 SYRINGE INTRAVENOUS PRN
Status: DISCONTINUED | OUTPATIENT
Start: 2024-05-29 | End: 2024-05-30 | Stop reason: HOSPADM

## 2024-05-29 RX ADMIN — SODIUM CHLORIDE, PRESERVATIVE FREE 10 ML: 5 INJECTION INTRAVENOUS at 08:40

## 2024-05-29 RX ADMIN — SODIUM CHLORIDE, PRESERVATIVE FREE 10 ML: 5 INJECTION INTRAVENOUS at 08:39

## 2024-05-29 RX ADMIN — SODIUM CHLORIDE, PRESERVATIVE FREE 10 ML: 5 INJECTION INTRAVENOUS at 09:29

## 2024-05-29 RX ADMIN — Medication 500 UNITS: at 09:29

## 2024-05-29 NOTE — PROGRESS NOTES
Melissa Bo                                                                                                                  5/29/2024  MRN:   [id number]  YOB: 1945  PCP:                           Gagandeep Melgoza DO  Referring Physician: No ref. provider found  Treating Physician Name: KIERSTEN RUELAS MD      Reason for visit:  Chief Complaint   Patient presents with    Follow-up    Constipation     Getting worswe    Numbness    Anorexia    Other     Burning sensation after getting tx   Toxicity check.  Discussed treatment plan.    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  Patient complains of being more weak.  Complains of being dizzy.  Patient has an appointment coming up at [facility] for a second opinion in early June.  Patient wants to hold treatment until seen and clean clinic.  Patient daughter is also wondering if patient would be a candidate for treatment with ivermectin.  Complains of constipation.  Complains of burning sensation at times under both armpits.    During this visit patient's allergy, social, medical, surgical history and medications were reviewed and updated.    Summary of Case/History:  Melissa Bo a 78 y.o.female who presents to the clinic to establish care for further workup and evaluation for recently diagnosed gynecological cancer likely right ovary primary with peritoneal metastasis.  Patient presents to the office accompanied by her  and daughter.  Patient was initially seen by gynecology at [facility] due to concerns

## 2024-05-29 NOTE — TELEPHONE ENCOUNTER
Name: Melissa Bo  : 1945  MRN: [id number]    Oncology Navigation Follow-Up Note    Contact Type:  Medical Oncology    Notes: Pt @ [facility] for Dr. Brown f/u & tx.  Accompanied Dr. Brown in exam room.  Pt & daughter w/multiple questions, all addressed by Dr. Brown.  Pt stated scheduled for [facility] second opinion appt  & requested chemotherapy held @ this time.  Dr. Brown requested pt scheduled for f/u  & possible tx r/t Dr. Brown out of office week of 6/10.  Encouraged pt to contact writer prn.  Will continue to follow.      Electronically signed by Kim Rainey RN on 2024 at 1:11 PM

## 2024-05-30 DIAGNOSIS — C48.2 CANCER OF PERITONEUM (HCC): Primary | ICD-10-CM

## 2024-05-30 DIAGNOSIS — C56.1 MALIGNANT NEOPLASM OF RIGHT OVARY (HCC): ICD-10-CM

## 2024-06-03 DIAGNOSIS — C56.1 MALIGNANT NEOPLASM OF RIGHT OVARY (HCC): ICD-10-CM

## 2024-06-03 DIAGNOSIS — Z51.11 CHEMOTHERAPY MANAGEMENT, ENCOUNTER FOR: ICD-10-CM

## 2024-06-03 RX ORDER — HYDROCODONE BITARTRATE AND ACETAMINOPHEN 5; 325 MG/1; MG/1
1 TABLET ORAL EVERY 8 HOURS PRN
Qty: 45 TABLET | Refills: 0 | Status: SHIPPED | OUTPATIENT
Start: 2024-06-03 | End: 2024-06-18

## 2024-06-04 ENCOUNTER — TELEPHONE (OUTPATIENT)
Dept: GYNECOLOGIC ONCOLOGY | Age: 79
End: 2024-06-04

## 2024-06-06 RX ORDER — OMEPRAZOLE 20 MG/1
20 CAPSULE, DELAYED RELEASE ORAL 2 TIMES DAILY
Qty: 60 CAPSULE | Refills: 0 | Status: SHIPPED | OUTPATIENT
Start: 2024-06-06

## 2024-06-07 ENCOUNTER — TELEPHONE (OUTPATIENT)
Dept: GYNECOLOGIC ONCOLOGY | Age: 79
End: 2024-06-07

## 2024-06-07 ENCOUNTER — TELEPHONE (OUTPATIENT)
Dept: ONCOLOGY | Age: 79
End: 2024-06-07

## 2024-06-07 NOTE — TELEPHONE ENCOUNTER
Daughter and patient called informing writer that at [facility] it was decided that patient will not be getting surgery or chemo treatment.  Patient will be continuing with oral maintenance chemo.  Patient given 2 medication options.  Daughter and patient inquiring if chemo maintenance medication recommended by [facility] could be ordered by Dr. Patrick.  Writer explained that chemo medications would be monitored by medical oncology.  Daughter voiced they would be contacting nurse navigator.  Daughter and patient questioned if Dr. Patrick would continue in patient's care.  Writer explained that that discussion can be had at 6/14/24 appt.  Patient voiced understanding and appreciation.

## 2024-06-07 NOTE — TELEPHONE ENCOUNTER
Name: Melissa Bo  : 1945  MRN: [id number]    Oncology Navigation Follow-Up Note    Contact Type:  Telephone    Notes: Pt & daughter left  stating pt completed [facility] consult, pt declined surgical intervention & further chemotherapy.  Inquired on starting parp inhibitor.  Spoke w/daughter notified Dr. Kevin STODDARD & returns week of .  Instructed pt to keep  Dr. Brown f/u.  Will continue to follow.      Electronically signed by Kim Rainey RN on 2024 at 3:15 PM

## 2024-06-14 ENCOUNTER — OFFICE VISIT (OUTPATIENT)
Dept: GYNECOLOGIC ONCOLOGY | Age: 79
End: 2024-06-14
Payer: MEDICARE

## 2024-06-14 VITALS
OXYGEN SATURATION: 98 % | DIASTOLIC BLOOD PRESSURE: 86 MMHG | HEART RATE: 69 BPM | TEMPERATURE: 98.8 F | SYSTOLIC BLOOD PRESSURE: 137 MMHG | WEIGHT: 132.6 LBS | BODY MASS INDEX: 22.07 KG/M2

## 2024-06-14 DIAGNOSIS — R97.8 ELEVATED TUMOR MARKERS: ICD-10-CM

## 2024-06-14 DIAGNOSIS — Z28.21 TETANUS, DIPHTHERIA, AND ACELLULAR PERTUSSIS (TDAP) VACCINATION DECLINED: ICD-10-CM

## 2024-06-14 DIAGNOSIS — R19.00 PELVIC MASS: ICD-10-CM

## 2024-06-14 DIAGNOSIS — Z28.21 COVID-19 VIRUS ANTIGEN VACCINE DECLINED: ICD-10-CM

## 2024-06-14 DIAGNOSIS — R41.3 MEMORY LOSS: ICD-10-CM

## 2024-06-14 DIAGNOSIS — R97.1 ELEVATED CANCER ANTIGEN 125 (CA 125): ICD-10-CM

## 2024-06-14 DIAGNOSIS — Z28.21 PNEUMOCOCCAL VACCINATION DECLINED: ICD-10-CM

## 2024-06-14 DIAGNOSIS — C57.9 MALIGNANT NEOPLASM OF FEMALE GENITAL ORGAN (HCC): Primary | ICD-10-CM

## 2024-06-14 DIAGNOSIS — I26.99 PULMONARY EMBOLISM, OTHER, UNSPECIFIED CHRONICITY, UNSPECIFIED WHETHER ACUTE COR PULMONALE PRESENT (HCC): ICD-10-CM

## 2024-06-14 DIAGNOSIS — Z28.21 INFLUENZA VACCINATION DECLINED: ICD-10-CM

## 2024-06-14 DIAGNOSIS — C78.6 PERITONEAL CARCINOMATOSIS (HCC): ICD-10-CM

## 2024-06-14 PROCEDURE — 3075F SYST BP GE 130 - 139MM HG: CPT | Performed by: OBSTETRICS & GYNECOLOGY

## 2024-06-14 PROCEDURE — 3079F DIAST BP 80-89 MM HG: CPT | Performed by: OBSTETRICS & GYNECOLOGY

## 2024-06-14 PROCEDURE — 1123F ACP DISCUSS/DSCN MKR DOCD: CPT | Performed by: OBSTETRICS & GYNECOLOGY

## 2024-06-14 PROCEDURE — 99215 OFFICE O/P EST HI 40 MIN: CPT | Performed by: OBSTETRICS & GYNECOLOGY

## 2024-06-14 NOTE — PROGRESS NOTES
Imaging:    Vascular Duplex LE US (5/20/24): No evidence DVT bilaterally.  
Review of Systems   Constitutional:  Positive for appetite change (decreased appetite) and fatigue. Negative for chills, diaphoresis, fever and unexpected weight change.   HENT:   Negative for hearing loss, lump/mass, mouth sores, nosebleeds, sore throat, tinnitus, trouble swallowing and voice change.    Eyes:  Negative for eye problems and icterus.        Increased blurriness   Respiratory:  Negative for chest tightness, cough, hemoptysis, shortness of breath and wheezing.    Cardiovascular:  Negative for chest pain, leg swelling and palpitations.   Gastrointestinal:  Positive for constipation. Negative for abdominal distention, abdominal pain, blood in stool, diarrhea, nausea, rectal pain and vomiting.   Endocrine: Negative for hot flashes.   Genitourinary:  Negative for bladder incontinence, difficulty urinating, dyspareunia, dysuria, frequency, hematuria, menstrual problem, nocturia, pelvic pain, vaginal bleeding and vaginal discharge.    Musculoskeletal:  Positive for back pain (lower back). Negative for arthralgias, flank pain, gait problem, myalgias, neck pain and neck stiffness.   Skin:  Negative for itching, rash and wound.   Neurological:  Positive for dizziness, extremity weakness (BUE and BLE), headaches and numbness. Negative for gait problem, light-headedness, seizures and speech difficulty.   Hematological:  Negative for adenopathy. Bruises/bleeds easily (on Eliquis).   Psychiatric/Behavioral:  Negative for confusion, decreased concentration, depression, sleep disturbance and suicidal ideas. The patient is not nervous/anxious.        
continue to remain concerned regarding the patient's memory loss. The patient's 87yo  also has carried some notable cognitive loss while in the office as well. The patient's daughter gently expresses concerns today as well. The patient and her  become quite defensive over this issue and decline to accept any of the symptoms.   The diagnosis and plan of care has been discussed during multiple visits, with a convincing basic understanding at the end of the visit. The focus of these visits get lost multiple times during the visit, where the patient and family require redirection to focus her thoughts. Multiple loosely connected thoughts were expressed by the patient and family today. Extended time was devoted to multiple explanations of the same concept. These symptoms may warrant further evaluation.     4.   Health Maintenance  Not up to date  Mammogram, Colorectal Cancer Screening: Refuses to complete, based on visit note by Dr. Gagandeep Melgoza on 10/12/18  Bone density scan is overdue since 2017  Tdap, Shingles, Pneumonia, Influenza and COVID-19 vaccines are not UTD. Refuses to complete, based on visit note by Dr. Gagandepe Melgoza on 10/12/18  TSH and HgBA1C testing are not up to date     DISPOSITION:      The patient verbalized understanding of our extensive discussion today and of follow up instructions. All questions were answered to her apparent satisfaction. Therefore, she agreed to proceed as planned. Return to the office in 2 months or sooner for any abnormal symptoms. Return precautions have been reviewed.      TIME SUMMARY:  60 minutes F2F time  has been coded  Total non-billable time spent: 120 minutes.  In total, 60 minutes included records review, independent interpretation of findings and chart completion outside of the date service (DATES: 09/02/24)  In total, 120 minutes included records review, obtaining history, performing the examination and / or evaluation, independent interpretation

## 2024-06-17 ENCOUNTER — TELEPHONE (OUTPATIENT)
Dept: ONCOLOGY | Age: 79
End: 2024-06-17

## 2024-06-17 NOTE — TELEPHONE ENCOUNTER
Name: Melissa Bo  : 1945  MRN: [id number]    Oncology Navigation Follow-Up Note    Contact Type:  Telephone    Notes: Dr. Brown notified pt completed [facility] consultation, declined surgical intervention, declined further chemotherapy txs, & agreeable to parp inhibitor.  Notified Dr. Brown pt's daughter called in requesting parp inhibitor ordered asap.  Dr. Brown stated will e-scribe & requested pt keep  f/u as scheduled.  Sarah, [facility] charge nurse, updated on pt & requested chemotherapy txs canceled.  Spoke w/Edwina, pt's daughter, notified Dr. Brown to e-scribe parp inhibitor, medication filled by specialty pharmacy, pt will receive call to set up delivery once PA approved.  Instructed Edwina pt to keep  Dr. Brown appt @ 0924.  Will continue to follow.      Electronically signed by Kim Rainey RN on 2024 at 11:23 AM

## 2024-06-18 DIAGNOSIS — C56.1 MALIGNANT NEOPLASM OF RIGHT OVARY (HCC): Primary | ICD-10-CM

## 2024-06-19 ENCOUNTER — HOSPITAL ENCOUNTER (OUTPATIENT)
Age: 79
Discharge: HOME | End: 2024-06-21
Attending: INTERNAL MEDICINE
Payer: MEDICARE

## 2024-06-19 ENCOUNTER — TELEPHONE (OUTPATIENT)
Facility: HOSPITAL | Age: 79
End: 2024-06-19

## 2024-06-19 ENCOUNTER — CLINICAL DOCUMENTATION (OUTPATIENT)
Facility: HOSPITAL | Age: 79
End: 2024-06-19

## 2024-06-19 VITALS — BODY MASS INDEX: 21.99 KG/M2 | WEIGHT: 132 LBS | HEIGHT: 65 IN

## 2024-06-19 DIAGNOSIS — I49.3 PVC (PREMATURE VENTRICULAR CONTRACTION): ICD-10-CM

## 2024-06-19 DIAGNOSIS — R42 DIZZINESS AND GIDDINESS: ICD-10-CM

## 2024-06-19 DIAGNOSIS — I49.1 PAC (PREMATURE ATRIAL CONTRACTION): ICD-10-CM

## 2024-06-19 DIAGNOSIS — Z86.711 HISTORY OF PULMONARY EMBOLISM: ICD-10-CM

## 2024-06-19 LAB
ECHO AO ROOT DIAM: 3 CM
ECHO AO ROOT INDEX: 1.81 CM/M2
ECHO AV AREA PEAK VELOCITY: 2.4 CM2
ECHO AV AREA/BSA PEAK VELOCITY: 1.4 CM2/M2
ECHO AV PEAK GRADIENT: 3 MMHG
ECHO AV PEAK VELOCITY: 0.9 M/S
ECHO AV VELOCITY RATIO: 0.78
ECHO BSA: 1.66 M2
ECHO BSA: 1.66 M2
ECHO EST RA PRESSURE: 3 MMHG
ECHO IVC EXP: 1.3 CM
ECHO LA AREA 2C: 13.1 CM2
ECHO LA AREA 4C: 17.5 CM2
ECHO LA DIAMETER INDEX: 1.45 CM/M2
ECHO LA DIAMETER: 2.4 CM
ECHO LA MAJOR AXIS: 5.1 CM
ECHO LA MINOR AXIS: 4.1 CM
ECHO LA TO AORTIC ROOT RATIO: 0.8
ECHO LA VOL BP: 44 ML (ref 22–52)
ECHO LA VOL MOD A2C: 34 ML (ref 22–52)
ECHO LA VOL MOD A4C: 47 ML (ref 22–52)
ECHO LA VOL/BSA BIPLANE: 27 ML/M2 (ref 16–34)
ECHO LA VOLUME INDEX MOD A2C: 20 ML/M2 (ref 16–34)
ECHO LA VOLUME INDEX MOD A4C: 28 ML/M2 (ref 16–34)
ECHO LV E' LATERAL VELOCITY: 5 CM/S
ECHO LV E' SEPTAL VELOCITY: 6 CM/S
ECHO LV EDV A2C: 63 ML
ECHO LV EDV A4C: 71 ML
ECHO LV EDV INDEX A4C: 43 ML/M2
ECHO LV EDV NDEX A2C: 38 ML/M2
ECHO LV EJECTION FRACTION A4C: 44 %
ECHO LV ESV A4C: 40 ML
ECHO LV ESV INDEX A4C: 24 ML/M2
ECHO LV FRACTIONAL SHORTENING: 28 % (ref 28–44)
ECHO LV INTERNAL DIMENSION DIASTOLE INDEX: 2.35 CM/M2
ECHO LV INTERNAL DIMENSION DIASTOLIC: 3.9 CM (ref 3.9–5.3)
ECHO LV INTERNAL DIMENSION SYSTOLIC INDEX: 1.69 CM/M2
ECHO LV INTERNAL DIMENSION SYSTOLIC: 2.8 CM
ECHO LV IVSD: 1.2 CM (ref 0.6–0.9)
ECHO LV MASS 2D: 169.4 G (ref 67–162)
ECHO LV MASS INDEX 2D: 102 G/M2 (ref 43–95)
ECHO LV POSTERIOR WALL DIASTOLIC: 1.3 CM (ref 0.6–0.9)
ECHO LV RELATIVE WALL THICKNESS RATIO: 0.67
ECHO LVOT AREA: 3.1 CM2
ECHO LVOT DIAM: 2 CM
ECHO LVOT MEAN GRADIENT: 1 MMHG
ECHO LVOT PEAK GRADIENT: 2 MMHG
ECHO LVOT PEAK VELOCITY: 0.7 M/S
ECHO LVOT STROKE VOLUME INDEX: 23.3 ML/M2
ECHO LVOT SV: 38.6 ML
ECHO LVOT VTI: 12.3 CM
ECHO MV A VELOCITY: 0.82 M/S
ECHO MV E DECELERATION TIME (DT): 161 MS
ECHO MV E VELOCITY: 0.32 M/S
ECHO MV E/A RATIO: 0.39
ECHO MV E/E' LATERAL: 6.4
ECHO MV E/E' RATIO (AVERAGED): 5.87
ECHO MV E/E' SEPTAL: 5.33
ECHO RIGHT VENTRICULAR SYSTOLIC PRESSURE (RVSP): 17 MMHG
ECHO RV TAPSE: 1.6 CM (ref 1.7–?)
ECHO TV REGURGITANT MAX VELOCITY: 1.85 M/S
ECHO TV REGURGITANT PEAK GRADIENT: 14 MMHG

## 2024-06-19 PROCEDURE — 93306 TTE W/DOPPLER COMPLETE: CPT

## 2024-06-19 PROCEDURE — 93225 XTRNL ECG REC<48 HRS REC: CPT

## 2024-06-19 NOTE — PROGRESS NOTES
Patient Assistance    Met with: Patient    Navigator Type: Oral  Documentation Type: New Patient  Contact Type: Telephone  Navigation Status: New Patient  Status of Patient Insurance Coverage: Patient has active coverage          Additional notes: Called and introduced myself to Melissa and went over her copay for Zejula.  Her copay is 2548 and the patient can not afford this large amount.  I offered to help apply for free drug and the patient was agreeable and appreciative for the help.  She will go into the office 6-24-24 to sign and bring in POI.    Drug Name: OTHER  Other Drug Name: Zejula  Form of PAP Assistance: Free Drug - Pending

## 2024-06-19 NOTE — TELEPHONE ENCOUNTER
Left a VM 6-18-21 and 6-19-24 for Melissa asking for a call regarding filling out an application for free Zejula.

## 2024-06-19 NOTE — TELEPHONE ENCOUNTER
Left a vm asking for a call back regarding applying for free drug Zejula.    Steffanie Amato  Financial Navigator  [facility]  [phone]

## 2024-06-21 ENCOUNTER — OFFICE VISIT (OUTPATIENT)
Dept: ONCOLOGY | Age: 79
End: 2024-06-21
Payer: MEDICARE

## 2024-06-21 ENCOUNTER — TELEPHONE (OUTPATIENT)
Dept: ONCOLOGY | Age: 79
End: 2024-06-21

## 2024-06-21 VITALS
BODY MASS INDEX: 22.13 KG/M2 | TEMPERATURE: 97.7 F | RESPIRATION RATE: 16 BRPM | SYSTOLIC BLOOD PRESSURE: 143 MMHG | HEART RATE: 81 BPM | DIASTOLIC BLOOD PRESSURE: 77 MMHG | WEIGHT: 133 LBS | OXYGEN SATURATION: 98 %

## 2024-06-21 DIAGNOSIS — Z51.11 CHEMOTHERAPY MANAGEMENT, ENCOUNTER FOR: ICD-10-CM

## 2024-06-21 DIAGNOSIS — C56.1 MALIGNANT NEOPLASM OF RIGHT OVARY (HCC): Primary | ICD-10-CM

## 2024-06-21 PROCEDURE — 3078F DIAST BP <80 MM HG: CPT | Performed by: INTERNAL MEDICINE

## 2024-06-21 PROCEDURE — 1123F ACP DISCUSS/DSCN MKR DOCD: CPT | Performed by: INTERNAL MEDICINE

## 2024-06-21 PROCEDURE — 99215 OFFICE O/P EST HI 40 MIN: CPT | Performed by: INTERNAL MEDICINE

## 2024-06-21 PROCEDURE — 3077F SYST BP >= 140 MM HG: CPT | Performed by: INTERNAL MEDICINE

## 2024-06-21 RX ORDER — HYDROCODONE BITARTRATE AND ACETAMINOPHEN 5; 325 MG/1; MG/1
1 TABLET ORAL EVERY 6 HOURS PRN
Qty: 120 TABLET | Refills: 0 | Status: SHIPPED | OUTPATIENT
Start: 2024-06-21 | End: 2024-07-21

## 2024-06-21 NOTE — PROGRESS NOTES
document that actually reflects the content of the visit, the document can still have some errors including those of syntax and sound a like substitutions which may escape proof reading.  It such instances, actual meaning can be extrapolated by contextual diversion.

## 2024-06-21 NOTE — TELEPHONE ENCOUNTER
Instructions   from Dr. Sergio Brown MD    Pt will call once she gets the pills   Labs on first day of starting pills  Rv once pt has been on the pills for 2 weeks     Pt advised to contact the office once she receives her pills.  Patient instructed to do labs the first day starting her pills.  RV to be scheduled once patient has been on medication for 2 weeks.   Message routed to nurse navigator for follow up.

## 2024-06-21 NOTE — PATIENT INSTRUCTIONS
Pt will call once she gets the pills   Labs on first day of starting pills  Rv once pt has been on the pills for 2 weeks

## 2024-06-21 NOTE — TELEPHONE ENCOUNTER
Name: Melissa Bo  : 1945  MRN: [id number]    Oncology Navigation Follow-Up Note    Contact Type:  Medical Oncology    Notes: Pt @ [facility] for Dr. Brown f/u.  Met w/pt & pt's family prior to f/u.  Pt denied questions/concerns.  Encouraged to contact writer prn.  Will continue to follow.      Electronically signed by Kim Rainey RN on 2024 at 10:50 AM

## 2024-06-25 ENCOUNTER — TELEPHONE (OUTPATIENT)
Dept: ONCOLOGY | Age: 79
End: 2024-06-25

## 2024-06-25 NOTE — TELEPHONE ENCOUNTER
[facility]- MEDICAL NUTRITION THERAPY     Visit Type: Follow-up     NUTRITION RECOMMENDATIONS / MONITORING / EVALUATION  Continue Regular diet; encouraged scheduled meal/snack intake as tolerated.    Encouraged use of homemade or commercial protein drinks/nutritional supplementation as needed.   Encouraged adequate hydration to help with constipation.  Will continue to monitor PO tolerance, adequacy of intake, weight, and care plans.     Subjective/Current Data:  Melissa oB is a 78 y.o. 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 Writer called and spoke with patient for nutrition follow-up. Pt reports appetite remains low. Not currently using any type of nutrition supplement; willing to try homemade smoothies. Pt denies and N/V, abdominal pain. States constipation has improved with use of stool softener/laxative. States she still has a hard time getting adequate hydration; has never been good at drinking water/fluids. Pt c/o dizziness and nerve pain in legs. States pain meds seem to help with nerve pain.      Anthropometric Measures:  Height: 5' 5\"  Weight: 133 lb (60.3 kg)    Ideal Body Weight: 125 lb (56.8 kg)  BMI: 22.13 kg/m2 (Normal Weight)   Weight Change: 13% loss in 4 months    Wt Readings from Last 20 Encounters:   06/19/24 59.9 kg (132 lb)   06/21/24 60.3 kg (133 lb)   06/14/24 60.1 kg (132 lb 9.6 oz)   05/29/24 60.8 kg (134 lb)   05/10/24 65.1 kg (143 lb 9.6 oz)   05/08/24 63 kg (139 lb)   05/03/24 63.7 kg (140 lb 6.4 oz)   04/17/24 64.2 kg (141 lb 9.6 oz)   03/27/24 66.3 kg (146 lb 3.2 oz)   03/06/24 68.9 kg (151 lb 14.4 oz)   02/23/24 69 kg (152 lb 1.6 oz)   02/16/24 69.9 kg (154 lb)   02/12/24 69.4 kg (153 lb)   02/05/24 69.4 kg (153 lb)     Estimated Nutrition Needs:  Estimated Calorie Needs: 1800 kcal/day   Estimated Protein Needs:80 g pro/day   Estimated Fluid Needs: 1800 mL/day     Current

## 2024-06-27 ENCOUNTER — TELEPHONE (OUTPATIENT)
Dept: ONCOLOGY | Age: 79
End: 2024-06-27

## 2024-06-27 LAB — ECHO BSA: 1.66 M2

## 2024-06-27 NOTE — TELEPHONE ENCOUNTER
Name: Melissa Bo  : 1945  MRN: [id number]    Oncology Navigation Follow-Up Note    Contact Type:  Telephone    Notes: Pt called in stating received call stating package ready for p[facility] [facility].  Pt stated \"turns out it was a scam\".  Instructed pt may contact genoveva Valiente financial navigator, to f/u on PAP assistance for zejula, contact # given.  Pt verbalized understanding & denied questions/concerns.  Encouraged to contact writer prn.  Will continue to follow.      Electronically signed by Kim Rainey RN on 2024 at 12:16 PM

## 2024-07-02 ENCOUNTER — TELEPHONE (OUTPATIENT)
Dept: ONCOLOGY | Age: 79
End: 2024-07-02

## 2024-07-02 NOTE — TELEPHONE ENCOUNTER
Name: Melissa Bo  : 1945  MRN: [id number]    Oncology Navigation Follow-Up Note    Contact Type:  Telephone    Notes[facility] alerted writer pt to receive zejula delivery tonight.  Will continue to follow.      Electronically signed by Kim Rainey RN on 2024 at 1:29 PM

## 2024-07-02 NOTE — TELEPHONE ENCOUNTER
[facility] Medication Management Clinic Note  Called patient. Discussed with patient that she needs labs completed day 1 of Zejula. Requested patient schedule to complete education with pharmacist at [facility] tomorrow 7/3 as well as doing her labs. Even though patient did receive notification she was to get the Zejula today, she is nervous that it isn't going to come today, because it hasn't arrived yet. She does not want to schedule to come in yet, she states she will call back when the medicine arrives.    Desiree Schmitz, PharmD  [facility]  7/2/2024 1:44 PM

## 2024-07-05 ENCOUNTER — HOSPITAL ENCOUNTER (OUTPATIENT)
Dept: PHARMACY | Age: 79
Setting detail: THERAPIES SERIES
Discharge: HOME | End: 2024-07-05

## 2024-07-08 ENCOUNTER — TELEPHONE (OUTPATIENT)
Dept: ONCOLOGY | Age: 79
End: 2024-07-08

## 2024-07-08 NOTE — TELEPHONE ENCOUNTER
Name: Melissa Bo  : 1945  MRN: [id number]    Oncology Navigation Follow-Up Note    Contact Type:  Telephone    Notes:  Spoke w/Desiree, [facility] oral compliance, to inquire on zejula teaching & labs.  Per Desiree, pt completed teaching  w/Jonel, [facility] pharmacist.  Per Desiree, pt unsure if willing to proceed w/zejula.  Spoke w/pt to inquire on tx decision.  Pt declined zejula.  Inquired if pt opened, pt stated no.  Instructed pt writer will update Dr. Brown.  Dr. Brown updated on pt.  Dr. Brown requested pt scheduled for f/u in 1 month w/cbc, cmp, ca 125, & keep zejula.  Spoke w/[facility] , updated on pt & requested f/u.   Pt scheduled  @ 1500.  Pt updated on conversation w/Dr. Brown &  appt details.  Pt verbalized understanding & denied questions/concerns.  Encouraged to contact writer prn.  Will continue to follow.      Electronically signed by Kim Rainey RN on 2024 at 9:33 AM

## 2024-07-15 RX ORDER — OMEPRAZOLE 20 MG/1
20 CAPSULE, DELAYED RELEASE ORAL 2 TIMES DAILY
Qty: 60 CAPSULE | Refills: 0 | Status: SHIPPED | OUTPATIENT
Start: 2024-07-15

## 2024-07-29 ENCOUNTER — TELEPHONE (OUTPATIENT)
Dept: ONCOLOGY | Age: 79
End: 2024-07-29

## 2024-07-29 DIAGNOSIS — C56.1 MALIGNANT NEOPLASM OF RIGHT OVARY (HCC): ICD-10-CM

## 2024-07-29 DIAGNOSIS — Z51.11 CHEMOTHERAPY MANAGEMENT, ENCOUNTER FOR: ICD-10-CM

## 2024-07-29 RX ORDER — METOPROLOL SUCCINATE 50 MG/1
50 TABLET, EXTENDED RELEASE ORAL DAILY
Qty: 30 TABLET | Refills: 0 | Status: SHIPPED | OUTPATIENT
Start: 2024-07-29

## 2024-07-31 ENCOUNTER — TELEPHONE (OUTPATIENT)
Dept: ONCOLOGY | Age: 79
End: 2024-07-31

## 2024-07-31 NOTE — TELEPHONE ENCOUNTER
Name: Melissa Bo  : 1945  MRN: [id number]    Oncology Navigation Follow-Up Note    Contact Type:  Telephone    Notes:   Covering navigator received call from pts. Spouse and reporting pt. Not eating or drinking? Spouse reporting pt. Had fever earlier of 101.2?  Retook temp while on phone with writer and 98 degrees.  Writer noting pt. Is not on cancer treatment for approx one month. Writer recommending pt. Increase her fluids and  If pt. would get weaker or ongoing fever without relief from Tylenol to call cancer center in AM or primary care. Pt. May want to consider evaluation in ED if dehydrated or becomes weaker. Spouse c/o sniffles last few days as well. Pt. Denies painful urination, chills, or diarrhea.       Electronically signed by Kathy Miller RN on 2024 at 4:28 PM

## 2024-08-02 ENCOUNTER — OFFICE VISIT (OUTPATIENT)
Dept: GYNECOLOGIC ONCOLOGY | Age: 79
End: 2024-08-02
Payer: MEDICARE

## 2024-08-02 ENCOUNTER — HOSPITAL ENCOUNTER (OUTPATIENT)
Age: 79
Setting detail: SPECIMEN
Discharge: HOME | End: 2024-08-02

## 2024-08-02 VITALS
BODY MASS INDEX: 21.43 KG/M2 | WEIGHT: 128.8 LBS | TEMPERATURE: 98.7 F | DIASTOLIC BLOOD PRESSURE: 75 MMHG | SYSTOLIC BLOOD PRESSURE: 108 MMHG | OXYGEN SATURATION: 97 % | HEART RATE: 55 BPM

## 2024-08-02 DIAGNOSIS — C57.9 MALIGNANT NEOPLASM OF FEMALE GENITAL ORGAN (HCC): Primary | ICD-10-CM

## 2024-08-02 DIAGNOSIS — Z51.11 CHEMOTHERAPY MANAGEMENT, ENCOUNTER FOR: ICD-10-CM

## 2024-08-02 DIAGNOSIS — C56.1 MALIGNANT NEOPLASM OF RIGHT OVARY (HCC): ICD-10-CM

## 2024-08-02 LAB
ALBUMIN SERPL-MCNC: 4.3 G/DL (ref 3.5–5.2)
ALBUMIN/GLOB SERPL: 2 {RATIO} (ref 1–2.5)
ALP SERPL-CCNC: 81 U/L (ref 35–104)
ALT SERPL-CCNC: 21 U/L (ref 10–35)
ANION GAP SERPL CALCULATED.3IONS-SCNC: 15 MMOL/L (ref 9–16)
AST SERPL-CCNC: 37 U/L (ref 10–35)
BASOPHILS # BLD: <0.03 K/UL (ref 0–0.2)
BASOPHILS NFR BLD: 1 % (ref 0–2)
BILIRUB SERPL-MCNC: 0.4 MG/DL (ref 0–1.2)
BUN SERPL-MCNC: 20 MG/DL (ref 8–23)
CALCIUM SERPL-MCNC: 9 MG/DL (ref 8.6–10.4)
CANCER AG125 SERPL-ACNC: 26 U/ML (ref 0–38)
CHLORIDE SERPL-SCNC: 99 MMOL/L (ref 98–107)
CO2 SERPL-SCNC: 24 MMOL/L (ref 20–31)
CREAT SERPL-MCNC: 1.3 MG/DL (ref 0.5–0.9)
EOSINOPHIL # BLD: <0.03 K/UL (ref 0–0.44)
EOSINOPHILS RELATIVE PERCENT: 0 % (ref 1–4)
ERYTHROCYTE [DISTWIDTH] IN BLOOD BY AUTOMATED COUNT: 12.8 % (ref 11.8–14.4)
GFR, ESTIMATED: 42 ML/MIN/1.73M2
GLUCOSE SERPL-MCNC: 101 MG/DL (ref 74–99)
HCT VFR BLD AUTO: 46 % (ref 36.3–47.1)
HGB BLD-MCNC: 15.6 G/DL (ref 11.9–15.1)
IMM GRANULOCYTES # BLD AUTO: <0.03 K/UL (ref 0–0.3)
IMM GRANULOCYTES NFR BLD: 0 %
LYMPHOCYTES NFR BLD: 1.17 K/UL (ref 1.1–3.7)
LYMPHOCYTES RELATIVE PERCENT: 32 % (ref 24–43)
MCH RBC QN AUTO: 32.6 PG (ref 25.2–33.5)
MCHC RBC AUTO-ENTMCNC: 33.9 G/DL (ref 28.4–34.8)
MCV RBC AUTO: 96.2 FL (ref 82.6–102.9)
MONOCYTES NFR BLD: 0.65 K/UL (ref 0.1–1.2)
MONOCYTES NFR BLD: 18 % (ref 3–12)
NEUTROPHILS NFR BLD: 50 % (ref 36–65)
NEUTS SEG NFR BLD: 1.83 K/UL (ref 1.5–8.1)
NRBC BLD-RTO: 0 PER 100 WBC
PLATELET # BLD AUTO: ABNORMAL K/UL (ref 138–453)
PLATELET, FLUORESCENCE: 151 K/UL (ref 138–453)
PLATELETS.RETICULATED NFR BLD AUTO: 13 % (ref 1.1–10.3)
POTASSIUM SERPL-SCNC: 3.5 MMOL/L (ref 3.7–5.3)
PROT SERPL-MCNC: 6.8 G/DL (ref 6.6–8.7)
RBC # BLD AUTO: 4.78 M/UL (ref 3.95–5.11)
SODIUM SERPL-SCNC: 138 MMOL/L (ref 136–145)
WBC OTHER # BLD: 3.7 K/UL (ref 3.5–11.3)

## 2024-08-02 PROCEDURE — 3078F DIAST BP <80 MM HG: CPT | Performed by: OBSTETRICS & GYNECOLOGY

## 2024-08-02 PROCEDURE — 99215 OFFICE O/P EST HI 40 MIN: CPT | Performed by: OBSTETRICS & GYNECOLOGY

## 2024-08-02 PROCEDURE — 1123F ACP DISCUSS/DSCN MKR DOCD: CPT | Performed by: OBSTETRICS & GYNECOLOGY

## 2024-08-02 PROCEDURE — 3074F SYST BP LT 130 MM HG: CPT | Performed by: OBSTETRICS & GYNECOLOGY

## 2024-08-02 NOTE — PROGRESS NOTES
[facility] Gynecologic Oncology  [address]  [place] [address]    GYNECOLOGIC ONCOLOGY   FOLLOW UP NOTE    PATIENT NAME: Melissa Bo  MRN: [id number]  DATE: 08/02/2024    REASON FOR VISIT:     Suspicion for gynecologic malignancy  On treatment follow up  Consideration for interval debulking procedure    HISTORY OF PRESENT ILLNESS:     Melissa Bo is an 79yo with symptoms of pelvic pressure and post-menopausal bleeding. CT-scan AP demonstrates 3cm deep liver lesion, peritoneal carcinomatosis (largest peritoneal nodule 1cm), 15cm pelvic mass, small amount ascites (01/26/24). Pathology results from the FNA of the omental / peritoneal nodule  is consistent with metastatic carcinoma, consistent with Mullerian origin (02/12/24). She has completed 4 cycles Carboplatin + Taxol (C4: 05/08/24), under the care of Dr. Sergio Brown. She has required IVF hydration 1 week after each chemotherapy cycle.  (02/16/24): 388 (H) => 33 (normal) (05/08/24). She presents to the office today, accompanied  (Chris, 87yo) and her daughter (Natalia).     The patient reports she continues to experience weight loss that she associates with her chemotherapy treatment. She reports she tolerates a regular diet with anorexia. She denies to have symptoms of nausea, abdominal bloating or early satiety.     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 She reports persistent dizziness and an off-balance sensation that is ill-treated by Meclizine and managed by Dr. Luna [Cardiology].     She reports she continues to experience back pain and left hip pain that she treats with a massage gun.        She denies to have any abdominal or pelvic pain symptoms. She denies taking any chronic analgesic medications. She reports to have normal bowel movements, without constipation or diarrhea. She denies to have any abnormal  symptoms. She denies to have any

## 2024-08-02 NOTE — PROGRESS NOTES
Review of Systems   Constitutional:  Positive for appetite change (decreased appetite) and unexpected weight change (weight loss). Negative for chills, diaphoresis, fatigue and fever.   HENT:   Negative for hearing loss, lump/mass, mouth sores, nosebleeds, sore throat, tinnitus, trouble swallowing and voice change.    Eyes:  Negative for eye problems and icterus.   Respiratory:  Negative for chest tightness, cough, hemoptysis, shortness of breath and wheezing.    Cardiovascular:  Negative for chest pain, leg swelling and palpitations.   Gastrointestinal:  Negative for abdominal distention, abdominal pain, blood in stool, constipation, diarrhea, nausea, rectal pain and vomiting.   Endocrine: Negative for hot flashes.   Genitourinary:  Negative for bladder incontinence, difficulty urinating, dyspareunia, dysuria, frequency, hematuria, menstrual problem, nocturia, pelvic pain, vaginal bleeding and vaginal discharge.    Musculoskeletal:  Negative for arthralgias, back pain, flank pain, gait problem, myalgias, neck pain and neck stiffness.   Skin:  Negative for itching, rash and wound.   Neurological:  Positive for numbness (neuropathy). Negative for dizziness, extremity weakness, gait problem, headaches, light-headedness, seizures and speech difficulty.   Hematological:  Negative for adenopathy. Does not bruise/bleed easily.   Psychiatric/Behavioral:  Negative for confusion, decreased concentration, depression, sleep disturbance and suicidal ideas. The patient is not nervous/anxious.         Feels down some days

## 2024-08-02 NOTE — PATIENT INSTRUCTIONS
Marleny Paris (Dietician): [phone]  Kim Rainey (Nurse Navigator): [phone]  Triage: [phone]

## 2024-08-02 NOTE — PROGRESS NOTES
[facility] Gynecologic Oncology  [address]  [place] [id number]    DATE OF SERVICE: 08/02/2024        *** PARTIALLY PREPPED ***  ... media, care everywhere ...      HPI:        CHART PREP FROM 06/14/24 TO PRESENT (these details have NOT been placed into each appropriate section. Included here to reference during visit)    Telephone note, Sofy Amato (06/19/24)  Left a VM 6-18-21 and 6-19-24 for Melissa asking for a call regarding filling out an application for free Zejula     Visit note, Dr. Sergio Brown (06/21/24) [Medical Oncology]  Patient was seen in [facility]. Patient does not want to undergo surgery. Does not want to proceed with any further chemo. She is however agreeable with niraparib/PARP inhibitor.  Patient has HRD positive disease would be a candidate for maintenance PARP inhibitor. Patient not given bevacizumab due to concern regarding involvement of colon with the pelvic mass    Discussed gabapentin for patient neuropathic pain but I am concerned about side effect such as dizziness.  Will hold off on adding gabapentin.  Dizziness is somewhat better with meclizine use as needed    Telephone note, Desiree Schmitz HALEY (07/02/24)  Called patient. Discussed with patient that she needs labs completed day 1 of Zejula. Requested patient schedule to complete education with pharmacist at [facility] tomorrow 7/3 as well as doing her labs. Even though patient did receive notification she was to get the Zejula today, she is nervous that it isn't going to come today, because it hasn't arrived yet. She does not want to schedule to come in yet, she states she will call back when the medicine arrives.     Telephone note, Kim Rainey RN (07/08/24)  Spoke w/Desiree [facility] oral compliance, to inquire on zejula teaching & labs. Per genoveva Ramírez completed teaching 7/5 w/[facility]onel [facility] pharmacist. Per genoveva Ramírez unsure if willing to proceed w/zejula. Spoke

## 2024-08-06 ENCOUNTER — TELEPHONE (OUTPATIENT)
Dept: ONCOLOGY | Age: 79
End: 2024-08-06

## 2024-08-06 NOTE — TELEPHONE ENCOUNTER
[facility] Oncology Nutrition Note:   Chart reviewed/called patient for nutrition follow-up. No answer. Detailed message left with callback number. Await return call or attempt to contact patient at a later time.

## 2024-08-09 ENCOUNTER — TELEPHONE (OUTPATIENT)
Dept: ONCOLOGY | Age: 79
End: 2024-08-09

## 2024-08-09 ENCOUNTER — CLINICAL DOCUMENTATION (OUTPATIENT)
Dept: ONCOLOGY | Age: 79
End: 2024-08-09

## 2024-08-09 ENCOUNTER — OFFICE VISIT (OUTPATIENT)
Dept: ONCOLOGY | Age: 79
End: 2024-08-09
Payer: MEDICARE

## 2024-08-09 VITALS
OXYGEN SATURATION: 96 % | SYSTOLIC BLOOD PRESSURE: 99 MMHG | TEMPERATURE: 96.8 F | WEIGHT: 125.9 LBS | DIASTOLIC BLOOD PRESSURE: 60 MMHG | RESPIRATION RATE: 18 BRPM | HEART RATE: 85 BPM | BODY MASS INDEX: 20.95 KG/M2

## 2024-08-09 DIAGNOSIS — C57.9 MALIGNANT NEOPLASM OF FEMALE GENITAL ORGAN (HCC): Primary | ICD-10-CM

## 2024-08-09 DIAGNOSIS — Z79.899 HIGH RISK MEDICATION USE: ICD-10-CM

## 2024-08-09 PROCEDURE — 1123F ACP DISCUSS/DSCN MKR DOCD: CPT | Performed by: INTERNAL MEDICINE

## 2024-08-09 PROCEDURE — 3078F DIAST BP <80 MM HG: CPT | Performed by: INTERNAL MEDICINE

## 2024-08-09 PROCEDURE — 3074F SYST BP LT 130 MM HG: CPT | Performed by: INTERNAL MEDICINE

## 2024-08-09 PROCEDURE — 99215 OFFICE O/P EST HI 40 MIN: CPT | Performed by: INTERNAL MEDICINE

## 2024-08-09 PROCEDURE — 99211 OFF/OP EST MAY X REQ PHY/QHP: CPT | Performed by: INTERNAL MEDICINE

## 2024-08-09 NOTE — PROGRESS NOTES
Melissa Bo                                                                                                                  8/9/2024  MRN:   [id number]  YOB: 1945  PCP:                           Gagandeep Melgoza DO  Referring Physician: No ref. 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  Patient has not started taking Zejula yet.  She is very concerned about the side effects.  Patient has lost weight since last seen in the office.  Complains of poor appetite.  Denies abdominal bloating.  Complains of being weak.  Complains of dizziness.   remains low.    During this visit patient's allergy, social, medical, surgical history and medications were reviewed and updated.    Summary of Case/History:  Melissa Bo a 78 y.o.female who presents to the clinic to establish care for further workup and evaluation for recently diagnosed gynecological cancer likely right ovary primary with peritoneal metastasis.  Patient presents to the office accompanied by her  and daughter.  Patient was initially seen by gynecology at [facility] due to concerns regarding postmenopausal bleeding.  She has been having postmenopausal bleeding for the last

## 2024-08-09 NOTE — TELEPHONE ENCOUNTER
ANNA HERE FOR FOLLOW UP   Cbc and cmp weekly   Rv in 3 weeks   MD VISIT: 8/30/24 @ 3:15PM   LABS PRINTED AND GIVEN ON EXIT   AVS PRINTED AND GIVEN ON EXIT

## 2024-08-09 NOTE — PROGRESS NOTES
[facility]- MEDICAL NUTRITION THERAPY     Visit Type: Follow-up     NUTRITION RECOMMENDATIONS / MONITORING / EVALUATION  Continue Regular diet; encouraged scheduled meal/snack intake as tolerated.    Encouraged use of nutritional supplementation; Ensure samples and coupons provided.   Will continue to monitor PO tolerance, adequacy of intake, weight, and care plans.     Subjective/Current Data:  Melissa Bo is a 78 y.o. 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 Pt and family inquiring about use of Ensure shakes d/t poor appetite and weight loss; writer encouraged use and provided samples and coupons. Pt denies any c/o abdominal pain, N/V/D, early satiety. Weight record reviewed: weight is down to 125 lbs.     Objective Data:  Patient Active Problem List    Diagnosis Date Noted    Influenza vaccination declined 05/26/2024    Pneumococcal vaccination declined 05/26/2024    Tetanus, diphtheria, and acellular pertussis (Tdap) vaccination declined 05/26/2024    COVID-19 virus antigen vaccine declined 05/26/2024    Memory loss 05/26/2024    Malignant neoplasm of female genital organ (HCC) 05/26/2024    Peritoneal carcinomatosis (HCC) 05/26/2024    Pulmonary embolism (HCC) 05/26/2024    Generalized osteoarthritis 05/09/2024    Postmenopausal bleeding 05/09/2024    Vitamin D deficiency 05/09/2024    Cancer of peritoneum (HCC) 02/23/2024    Other specified conditions associated with female genital organs and menstrual cycle 01/25/2024    Pelvic mass 01/25/2024    Osteoarthritis of right hip 05/16/2022    PAT (paroxysmal atrial tachycardia) (HCC) 12/23/2020    Cyst of skin 09/03/2019    Former cigarette smoker 06/12/2019    Lightheadedness 06/12/2019    Intermittent palpitations 04/10/2019

## 2024-08-15 DIAGNOSIS — C56.1 MALIGNANT NEOPLASM OF RIGHT OVARY (HCC): ICD-10-CM

## 2024-08-16 ENCOUNTER — HOSPITAL ENCOUNTER (OUTPATIENT)
Age: 79
Discharge: HOME | End: 2024-08-16
Payer: MEDICARE

## 2024-08-16 DIAGNOSIS — C57.9 MALIGNANT NEOPLASM OF FEMALE GENITAL ORGAN (HCC): ICD-10-CM

## 2024-08-16 DIAGNOSIS — Z79.899 HIGH RISK MEDICATION USE: ICD-10-CM

## 2024-08-16 LAB
ALBUMIN SERPL-MCNC: 4 G/DL (ref 3.5–5.2)
ALBUMIN/GLOB SERPL: 1.5 {RATIO} (ref 1–2.5)
ALP SERPL-CCNC: 103 U/L (ref 35–104)
ALT SERPL-CCNC: 10 U/L (ref 5–33)
ANION GAP SERPL CALCULATED.3IONS-SCNC: 10 MMOL/L (ref 9–17)
AST SERPL-CCNC: 20 U/L
BASOPHILS # BLD: 0 K/UL (ref 0–0.2)
BASOPHILS NFR BLD: 1 % (ref 0–2)
BILIRUB SERPL-MCNC: 0.7 MG/DL (ref 0.3–1.2)
BUN SERPL-MCNC: 17 MG/DL (ref 8–23)
CALCIUM SERPL-MCNC: 9.7 MG/DL (ref 8.6–10.4)
CHLORIDE SERPL-SCNC: 107 MMOL/L (ref 98–107)
CO2 SERPL-SCNC: 27 MMOL/L (ref 20–31)
CREAT SERPL-MCNC: 0.9 MG/DL (ref 0.5–0.9)
EOSINOPHIL # BLD: 0 K/UL (ref 0–0.4)
EOSINOPHILS RELATIVE PERCENT: 1 % (ref 1–4)
ERYTHROCYTE [DISTWIDTH] IN BLOOD BY AUTOMATED COUNT: 12.5 % (ref 12.5–15.4)
GFR, ESTIMATED: 65 ML/MIN/1.73M2
GLUCOSE SERPL-MCNC: 167 MG/DL (ref 70–99)
HCT VFR BLD AUTO: 41.1 % (ref 36–46)
HGB BLD-MCNC: 13.8 G/DL (ref 12–16)
LYMPHOCYTES NFR BLD: 1.1 K/UL (ref 1–4.8)
LYMPHOCYTES RELATIVE PERCENT: 19 % (ref 24–44)
MCH RBC QN AUTO: 32.1 PG (ref 26–34)
MCHC RBC AUTO-ENTMCNC: 33.7 G/DL (ref 31–37)
MCV RBC AUTO: 95.3 FL (ref 80–100)
MONOCYTES NFR BLD: 0.4 K/UL (ref 0.1–1.2)
MONOCYTES NFR BLD: 7 % (ref 2–11)
NEUTROPHILS NFR BLD: 72 % (ref 36–66)
NEUTS SEG NFR BLD: 4.3 K/UL (ref 1.8–7.7)
PLATELET # BLD AUTO: 244 K/UL (ref 140–450)
PMV BLD AUTO: 10.2 FL (ref 6–12)
POTASSIUM SERPL-SCNC: 4.4 MMOL/L (ref 3.7–5.3)
PROT SERPL-MCNC: 6.6 G/DL (ref 6.4–8.3)
RBC # BLD AUTO: 4.31 M/UL (ref 4–5.2)
SODIUM SERPL-SCNC: 144 MMOL/L (ref 135–144)
WBC OTHER # BLD: 5.9 K/UL (ref 3.5–11)

## 2024-08-16 PROCEDURE — 85025 COMPLETE CBC W/AUTO DIFF WBC: CPT

## 2024-08-16 PROCEDURE — 80053 COMPREHEN METABOLIC PANEL: CPT

## 2024-08-16 PROCEDURE — 36415 COLL VENOUS BLD VENIPUNCTURE: CPT

## 2024-08-23 ENCOUNTER — TELEPHONE (OUTPATIENT)
Dept: ONCOLOGY | Age: 79
End: 2024-08-23

## 2024-08-23 NOTE — TELEPHONE ENCOUNTER
Name: Melissa Bo  : 1945  MRN: [id number]    Oncology Navigation Follow-Up Note    Contact Type:  Telephone    Notes: Attempted to contact pt, no answer, VM left reminded pt to complete labs today @ [facility].  Will continue to follow.      Electronically signed by Kim Rainey RN on 2024 at 1:20 PM

## 2024-08-26 ENCOUNTER — HOSPITAL ENCOUNTER (OUTPATIENT)
Age: 79
Discharge: HOME | End: 2024-08-26
Payer: MEDICARE

## 2024-08-26 DIAGNOSIS — C48.2 CANCER OF PERITONEUM (HCC): ICD-10-CM

## 2024-08-26 LAB
ALBUMIN SERPL-MCNC: 4.3 G/DL (ref 3.5–5.2)
ALBUMIN/GLOB SERPL: 1.7 {RATIO} (ref 1–2.5)
ALP SERPL-CCNC: 118 U/L (ref 35–104)
ALT SERPL-CCNC: 12 U/L (ref 5–33)
ANION GAP SERPL CALCULATED.3IONS-SCNC: 10 MMOL/L (ref 9–17)
AST SERPL-CCNC: 20 U/L
BASOPHILS # BLD: 0.1 K/UL (ref 0–0.2)
BASOPHILS NFR BLD: 1 % (ref 0–2)
BILIRUB SERPL-MCNC: 0.5 MG/DL (ref 0.3–1.2)
BUN SERPL-MCNC: 16 MG/DL (ref 8–23)
CALCIUM SERPL-MCNC: 9.3 MG/DL (ref 8.6–10.4)
CHLORIDE SERPL-SCNC: 105 MMOL/L (ref 98–107)
CO2 SERPL-SCNC: 26 MMOL/L (ref 20–31)
CREAT SERPL-MCNC: 0.9 MG/DL (ref 0.5–0.9)
EOSINOPHIL # BLD: 0.1 K/UL (ref 0–0.4)
EOSINOPHILS RELATIVE PERCENT: 1 % (ref 1–4)
ERYTHROCYTE [DISTWIDTH] IN BLOOD BY AUTOMATED COUNT: 12.7 % (ref 12.5–15.4)
GFR, ESTIMATED: 65 ML/MIN/1.73M2
GLUCOSE SERPL-MCNC: 123 MG/DL (ref 70–99)
HCT VFR BLD AUTO: 41.2 % (ref 36–46)
HGB BLD-MCNC: 14.1 G/DL (ref 12–16)
LYMPHOCYTES NFR BLD: 1.6 K/UL (ref 1–4.8)
LYMPHOCYTES RELATIVE PERCENT: 24 % (ref 24–44)
MCH RBC QN AUTO: 32.4 PG (ref 26–34)
MCHC RBC AUTO-ENTMCNC: 34.1 G/DL (ref 31–37)
MCV RBC AUTO: 95 FL (ref 80–100)
MONOCYTES NFR BLD: 0.5 K/UL (ref 0.1–1.2)
MONOCYTES NFR BLD: 8 % (ref 2–11)
NEUTROPHILS NFR BLD: 66 % (ref 36–66)
NEUTS SEG NFR BLD: 4.3 K/UL (ref 1.8–7.7)
PLATELET # BLD AUTO: 212 K/UL (ref 140–450)
PMV BLD AUTO: 10.1 FL (ref 6–12)
POTASSIUM SERPL-SCNC: 4.2 MMOL/L (ref 3.7–5.3)
PROT SERPL-MCNC: 6.9 G/DL (ref 6.4–8.3)
RBC # BLD AUTO: 4.34 M/UL (ref 4–5.2)
SODIUM SERPL-SCNC: 141 MMOL/L (ref 135–144)
WBC OTHER # BLD: 6.6 K/UL (ref 3.5–11)

## 2024-08-26 PROCEDURE — 80053 COMPREHEN METABOLIC PANEL: CPT

## 2024-08-26 PROCEDURE — 36415 COLL VENOUS BLD VENIPUNCTURE: CPT

## 2024-08-26 PROCEDURE — 85025 COMPLETE CBC W/AUTO DIFF WBC: CPT

## 2024-08-29 DIAGNOSIS — Z51.11 CHEMOTHERAPY MANAGEMENT, ENCOUNTER FOR: ICD-10-CM

## 2024-08-29 DIAGNOSIS — C56.1 MALIGNANT NEOPLASM OF RIGHT OVARY (HCC): ICD-10-CM

## 2024-08-29 RX ORDER — METOPROLOL SUCCINATE 50 MG/1
50 TABLET, EXTENDED RELEASE ORAL DAILY
Qty: 30 TABLET | Refills: 0 | Status: SHIPPED | OUTPATIENT
Start: 2024-08-29 | End: 2024-08-30 | Stop reason: SDUPTHER

## 2024-08-30 ENCOUNTER — TELEPHONE (OUTPATIENT)
Dept: ONCOLOGY | Age: 79
End: 2024-08-30

## 2024-08-30 ENCOUNTER — OFFICE VISIT (OUTPATIENT)
Dept: ONCOLOGY | Age: 79
End: 2024-08-30
Payer: MEDICARE

## 2024-08-30 ENCOUNTER — HOSPITAL ENCOUNTER (OUTPATIENT)
Age: 79
Discharge: HOME | End: 2024-08-30

## 2024-08-30 VITALS
BODY MASS INDEX: 21.2 KG/M2 | DIASTOLIC BLOOD PRESSURE: 66 MMHG | HEART RATE: 49 BPM | OXYGEN SATURATION: 98 % | TEMPERATURE: 96.8 F | SYSTOLIC BLOOD PRESSURE: 157 MMHG | WEIGHT: 127.4 LBS | RESPIRATION RATE: 18 BRPM

## 2024-08-30 DIAGNOSIS — C56.1 MALIGNANT NEOPLASM OF RIGHT OVARY (HCC): ICD-10-CM

## 2024-08-30 DIAGNOSIS — C57.9 MALIGNANT NEOPLASM OF FEMALE GENITAL ORGAN (HCC): Primary | ICD-10-CM

## 2024-08-30 DIAGNOSIS — Z51.11 CHEMOTHERAPY MANAGEMENT, ENCOUNTER FOR: ICD-10-CM

## 2024-08-30 DIAGNOSIS — C48.2 CANCER OF PERITONEUM (HCC): ICD-10-CM

## 2024-08-30 PROCEDURE — 3077F SYST BP >= 140 MM HG: CPT | Performed by: INTERNAL MEDICINE

## 2024-08-30 PROCEDURE — 3078F DIAST BP <80 MM HG: CPT | Performed by: INTERNAL MEDICINE

## 2024-08-30 PROCEDURE — 99211 OFF/OP EST MAY X REQ PHY/QHP: CPT | Performed by: INTERNAL MEDICINE

## 2024-08-30 PROCEDURE — 1123F ACP DISCUSS/DSCN MKR DOCD: CPT | Performed by: INTERNAL MEDICINE

## 2024-08-30 PROCEDURE — 99214 OFFICE O/P EST MOD 30 MIN: CPT | Performed by: INTERNAL MEDICINE

## 2024-08-30 RX ORDER — METOPROLOL SUCCINATE 50 MG/1
50 TABLET, EXTENDED RELEASE ORAL DAILY
Qty: 30 TABLET | Refills: 0 | Status: SHIPPED | OUTPATIENT
Start: 2024-08-30

## 2024-08-30 NOTE — PROGRESS NOTES
Melissa Bo                                                                                                                  8/30/2024  MRN:   [id number]  YOB: 1945  PCP:                           Gagandeep Melgoza DO  Referring Physician: No ref. provider found  Treating Physician Name: KIERSTEN RUELAS MD      Reason for visit:  Chief Complaint   Patient presents with    Follow-up     3 week   Toxicity check.  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  Patient is now on Zejula 200 mg p.o. daily.  Overall tolerating with manageable side effects.  Does complain of having dizziness at times.  Denies hospitalization ER visit.  Weight has been stable compared to the last visit.    During this visit patient's allergy, social, medical, surgical history and medications were reviewed and updated.    Summary of Case/History:  Melissa Bo a 78 y.o.female who presents to the clinic to establish care for further workup and evaluation for recently diagnosed gynecological cancer likely right ovary primary with peritoneal metastasis.  Patient presents to the office accompanied by her  and daughter.  Patient was initially seen by gynecology at [facility] due to concerns regarding postmenopausal bleeding.  She has been having postmenopausal bleeding for the last few months.  Underwent further workup and evaluation including pelvic ultrasound which revealed bilateral enlarged ovaries.  Subsequently  2024  Patient was also seen by dietitian.  Patient will be taking nutritional supplements.  NCCN guidelines were reviewed and discussed with the patient.  The diagnosis and care plan were discussed with the patient in detail. I discussed the natural history of the disease, prognosis, risks and goals of therapy and answered all the patients questions to the best of my ability.  Patient expressed understanding and was in agreement.    KIERSTEN RUELAS MD    This note is created with the assistance of a speech recognition program.  While intending to generate a document that actually reflects the content of the visit, the document can still have some errors including those of syntax and sound a like substitutions which may escape proof reading.  It such instances, actual meaning can be extrapolated by contextual diversion.

## 2024-08-30 NOTE — TELEPHONE ENCOUNTER
Instructions   from Dr. Sergio Brown MD    Rv in 4 weeks  with labs       RV 10/4/24 at 3:15 with labs

## 2024-09-02 DIAGNOSIS — C56.1 MALIGNANT NEOPLASM OF RIGHT OVARY (HCC): ICD-10-CM

## 2024-09-02 DIAGNOSIS — Z51.11 CHEMOTHERAPY MANAGEMENT, ENCOUNTER FOR: ICD-10-CM

## 2024-09-02 DIAGNOSIS — C57.9 MALIGNANT NEOPLASM OF FEMALE GENITAL ORGAN (HCC): ICD-10-CM

## 2024-09-09 ENCOUNTER — TELEPHONE (OUTPATIENT)
Dept: ONCOLOGY | Age: 79
End: 2024-09-09

## 2024-09-10 DIAGNOSIS — Z51.11 CHEMOTHERAPY MANAGEMENT, ENCOUNTER FOR: ICD-10-CM

## 2024-09-10 DIAGNOSIS — C56.1 MALIGNANT NEOPLASM OF RIGHT OVARY (HCC): ICD-10-CM

## 2024-09-10 RX ORDER — APIXABAN 5 MG/1
5 TABLET, FILM COATED ORAL 2 TIMES DAILY
Qty: 180 TABLET | Refills: 0 | Status: SHIPPED | OUTPATIENT
Start: 2024-09-10

## 2024-09-19 ENCOUNTER — TELEPHONE (OUTPATIENT)
Dept: ONCOLOGY | Age: 79
End: 2024-09-19

## 2024-09-24 DIAGNOSIS — C57.9 MALIGNANT NEOPLASM OF FEMALE GENITAL ORGAN (HCC): ICD-10-CM

## 2024-09-24 DIAGNOSIS — Z51.11 CHEMOTHERAPY MANAGEMENT, ENCOUNTER FOR: ICD-10-CM

## 2024-09-24 DIAGNOSIS — C56.1 MALIGNANT NEOPLASM OF RIGHT OVARY (HCC): ICD-10-CM

## 2024-09-24 RX ORDER — METOPROLOL SUCCINATE 50 MG/1
50 TABLET, EXTENDED RELEASE ORAL DAILY
Qty: 30 TABLET | Refills: 0 | Status: SHIPPED | OUTPATIENT
Start: 2024-09-24

## 2024-09-30 ENCOUNTER — HOSPITAL ENCOUNTER (OUTPATIENT)
Age: 79
Discharge: HOME | End: 2024-09-30
Payer: MEDICARE

## 2024-09-30 DIAGNOSIS — Z51.11 CHEMOTHERAPY MANAGEMENT, ENCOUNTER FOR: ICD-10-CM

## 2024-09-30 DIAGNOSIS — C48.2 CANCER OF PERITONEUM (HCC): ICD-10-CM

## 2024-09-30 DIAGNOSIS — C56.1 MALIGNANT NEOPLASM OF RIGHT OVARY (HCC): ICD-10-CM

## 2024-09-30 DIAGNOSIS — C57.9 MALIGNANT NEOPLASM OF FEMALE GENITAL ORGAN (HCC): ICD-10-CM

## 2024-09-30 LAB
ALBUMIN SERPL-MCNC: 4 G/DL (ref 3.5–5.2)
ALBUMIN/GLOB SERPL: 1.7 {RATIO} (ref 1–2.5)
ALP SERPL-CCNC: 110 U/L (ref 35–104)
ALT SERPL-CCNC: 13 U/L (ref 5–33)
ANION GAP SERPL CALCULATED.3IONS-SCNC: 9 MMOL/L (ref 9–17)
AST SERPL-CCNC: 18 U/L
BASOPHILS # BLD: 0.1 K/UL (ref 0–0.2)
BASOPHILS NFR BLD: 2 % (ref 0–2)
BILIRUB SERPL-MCNC: 0.5 MG/DL (ref 0.3–1.2)
BUN SERPL-MCNC: 17 MG/DL (ref 8–23)
CALCIUM SERPL-MCNC: 9.2 MG/DL (ref 8.6–10.4)
CANCER AG125 SERPL-ACNC: 37 U/ML (ref 0–38)
CHLORIDE SERPL-SCNC: 105 MMOL/L (ref 98–107)
CO2 SERPL-SCNC: 27 MMOL/L (ref 20–31)
CREAT SERPL-MCNC: 0.9 MG/DL (ref 0.5–0.9)
EOSINOPHIL # BLD: 0.1 K/UL (ref 0–0.4)
EOSINOPHILS RELATIVE PERCENT: 2 % (ref 1–4)
ERYTHROCYTE [DISTWIDTH] IN BLOOD BY AUTOMATED COUNT: 16.4 % (ref 12.5–15.4)
GFR, ESTIMATED: 65 ML/MIN/1.73M2
GLUCOSE SERPL-MCNC: 110 MG/DL (ref 70–99)
HCT VFR BLD AUTO: 37.3 % (ref 36–46)
HGB BLD-MCNC: 12.6 G/DL (ref 12–16)
LYMPHOCYTES NFR BLD: 1.6 K/UL (ref 1–4.8)
LYMPHOCYTES RELATIVE PERCENT: 24 % (ref 24–44)
MCH RBC QN AUTO: 33.3 PG (ref 26–34)
MCHC RBC AUTO-ENTMCNC: 33.9 G/DL (ref 31–37)
MCV RBC AUTO: 98.2 FL (ref 80–100)
MONOCYTES NFR BLD: 0.5 K/UL (ref 0.1–1.2)
MONOCYTES NFR BLD: 8 % (ref 2–11)
NEUTROPHILS NFR BLD: 64 % (ref 36–66)
NEUTS SEG NFR BLD: 4.2 K/UL (ref 1.8–7.7)
PLATELET # BLD AUTO: 205 K/UL (ref 140–450)
PMV BLD AUTO: 10.2 FL (ref 6–12)
POTASSIUM SERPL-SCNC: 4.3 MMOL/L (ref 3.7–5.3)
PROT SERPL-MCNC: 6.4 G/DL (ref 6.4–8.3)
RBC # BLD AUTO: 3.8 M/UL (ref 4–5.2)
SODIUM SERPL-SCNC: 141 MMOL/L (ref 135–144)
WBC OTHER # BLD: 6.6 K/UL (ref 3.5–11)

## 2024-09-30 PROCEDURE — 36415 COLL VENOUS BLD VENIPUNCTURE: CPT

## 2024-09-30 PROCEDURE — 85025 COMPLETE CBC W/AUTO DIFF WBC: CPT

## 2024-09-30 PROCEDURE — 86304 IMMUNOASSAY TUMOR CA 125: CPT

## 2024-09-30 PROCEDURE — 80053 COMPREHEN METABOLIC PANEL: CPT

## 2024-10-02 DIAGNOSIS — C57.9 MALIGNANT NEOPLASM OF FEMALE GENITAL ORGAN (HCC): ICD-10-CM

## 2024-10-02 DIAGNOSIS — Z51.11 CHEMOTHERAPY MANAGEMENT, ENCOUNTER FOR: ICD-10-CM

## 2024-10-02 DIAGNOSIS — C56.1 MALIGNANT NEOPLASM OF RIGHT OVARY (HCC): ICD-10-CM

## 2024-10-04 ENCOUNTER — TELEPHONE (OUTPATIENT)
Dept: ONCOLOGY | Age: 79
End: 2024-10-04

## 2024-10-04 ENCOUNTER — OFFICE VISIT (OUTPATIENT)
Dept: ONCOLOGY | Age: 79
End: 2024-10-04
Payer: MEDICARE

## 2024-10-04 VITALS
SYSTOLIC BLOOD PRESSURE: 149 MMHG | BODY MASS INDEX: 21.08 KG/M2 | WEIGHT: 126.7 LBS | OXYGEN SATURATION: 97 % | TEMPERATURE: 96.8 F | DIASTOLIC BLOOD PRESSURE: 98 MMHG | HEART RATE: 94 BPM | RESPIRATION RATE: 18 BRPM

## 2024-10-04 DIAGNOSIS — C57.9 MALIGNANT NEOPLASM OF FEMALE GENITAL ORGAN (HCC): Primary | ICD-10-CM

## 2024-10-04 DIAGNOSIS — Z51.11 CHEMOTHERAPY MANAGEMENT, ENCOUNTER FOR: ICD-10-CM

## 2024-10-04 DIAGNOSIS — R97.1 ELEVATED CANCER ANTIGEN 125 (CA 125): ICD-10-CM

## 2024-10-04 DIAGNOSIS — C56.1 MALIGNANT NEOPLASM OF RIGHT OVARY (HCC): ICD-10-CM

## 2024-10-04 PROCEDURE — 99215 OFFICE O/P EST HI 40 MIN: CPT | Performed by: INTERNAL MEDICINE

## 2024-10-04 PROCEDURE — 3079F DIAST BP 80-89 MM HG: CPT | Performed by: INTERNAL MEDICINE

## 2024-10-04 PROCEDURE — 3077F SYST BP >= 140 MM HG: CPT | Performed by: INTERNAL MEDICINE

## 2024-10-04 PROCEDURE — 1123F ACP DISCUSS/DSCN MKR DOCD: CPT | Performed by: INTERNAL MEDICINE

## 2024-10-04 NOTE — PROGRESS NOTES
Potassium 4.3 3.7 - 5.3 mmol/L    Chloride 105 98 - 107 mmol/L    CO2 27 20 - 31 mmol/L    Anion Gap 9 9 - 17 mmol/L    Glucose 110 (H) 70 - 99 mg/dL    BUN 17 8 - 23 mg/dL    Creatinine 0.9 0.5 - 0.9 mg/dL    Est, Glom Filt Rate 65 >60 mL/min/1.73m2    Calcium 9.2 8.6 - 10.4 mg/dL    Total Protein 6.4 6.4 - 8.3 g/dL    Albumin 4.0 3.5 - 5.2 g/dL    Albumin/Globulin Ratio 1.7 1.0 - 2.5    Total Bilirubin 0.5 0.3 - 1.2 mg/dL    Alkaline Phosphatase 110 (H) 35 - 104 U/L    ALT 13 5 - 33 U/L    AST 18 <32 U/L      Result Value Ref Range     37 0 - 38 U/mL     Echo (TTE) complete (PRN contrast/bubble/strain/3D)    Result Date: 6/19/2024    Left Ventricle: Normal left ventricular systolic function with a visually estimated EF of 50 - 55%. Left ventricle size is normal. Mildly increased wall thickness. Wall motion and function difficult to assess due to heart rhythm. Normal wall motion. Grade I diastolic dysfunction with normal LAP.   Pericardium: Moderate (1-2 cm) pericardial effusion present. Pericardial effusion is loculated.   Image quality is technically difficult.      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  Discussed natural history of ovarian cancer.  Chemotherapy was discontinued due to side effects and also because patient did not want to continue further cytotoxic therapy  Patient not interested in surgery.  Patient now on Zejula 200 mg daily which she seems to be tolerating with manageable side effects.  Patient has HRD positive disease.  Continue check  periodically.  Patient wants to know the status of

## 2024-10-04 NOTE — TELEPHONE ENCOUNTER
Name: Melissa Bo  : 1945  MRN: [id number]    Oncology Navigation Follow-Up Note    Contact Type:  Medical Oncology    Notes: [facility]. Brown f/u.  Met w/pt & pt's family prior to f/u.  Pt c/o dizziness after starting zejula & stated will discuss further w/Dr. Brown.  Pt denied questions/concerns for writer.  Encouraged to contact writer prn.  Will continue to follow.      Electronically signed by Kim Rainey RN on 10/4/2024 at 4:07 PM

## 2024-10-11 ENCOUNTER — OFFICE VISIT (OUTPATIENT)
Dept: GYNECOLOGIC ONCOLOGY | Age: 79
End: 2024-10-11
Payer: MEDICARE

## 2024-10-11 VITALS
OXYGEN SATURATION: 98 % | SYSTOLIC BLOOD PRESSURE: 132 MMHG | HEART RATE: 75 BPM | BODY MASS INDEX: 21.03 KG/M2 | TEMPERATURE: 97.2 F | DIASTOLIC BLOOD PRESSURE: 88 MMHG | WEIGHT: 126.4 LBS

## 2024-10-11 DIAGNOSIS — R42 DIZZINESS: ICD-10-CM

## 2024-10-11 DIAGNOSIS — R26.89 IMBALANCE: ICD-10-CM

## 2024-10-11 DIAGNOSIS — R19.00 PELVIC MASS: ICD-10-CM

## 2024-10-11 DIAGNOSIS — C57.9 MALIGNANT NEOPLASM OF FEMALE GENITAL ORGAN (HCC): Primary | ICD-10-CM

## 2024-10-11 DIAGNOSIS — I26.99 PULMONARY EMBOLISM, OTHER, UNSPECIFIED CHRONICITY, UNSPECIFIED WHETHER ACUTE COR PULMONALE PRESENT (HCC): ICD-10-CM

## 2024-10-11 DIAGNOSIS — R41.3 MEMORY LOSS: ICD-10-CM

## 2024-10-11 PROCEDURE — 1123F ACP DISCUSS/DSCN MKR DOCD: CPT | Performed by: OBSTETRICS & GYNECOLOGY

## 2024-10-11 PROCEDURE — 3075F SYST BP GE 130 - 139MM HG: CPT | Performed by: OBSTETRICS & GYNECOLOGY

## 2024-10-11 PROCEDURE — 3079F DIAST BP 80-89 MM HG: CPT | Performed by: OBSTETRICS & GYNECOLOGY

## 2024-10-11 PROCEDURE — 99214 OFFICE O/P EST MOD 30 MIN: CPT | Performed by: OBSTETRICS & GYNECOLOGY

## 2024-10-11 ASSESSMENT — ENCOUNTER SYMPTOMS
WHEEZING: 0
EYE PROBLEMS: 0
BLOOD IN STOOL: 0
CONSTIPATION: 1
SHORTNESS OF BREATH: 0
COUGH: 0
NAUSEA: 0
RECTAL PAIN: 0
HEMOPTYSIS: 0
SCLERAL ICTERUS: 0
ABDOMINAL DISTENTION: 0
BACK PAIN: 1
SORE THROAT: 0
CHEST TIGHTNESS: 0
ABDOMINAL PAIN: 0
TROUBLE SWALLOWING: 0
VOMITING: 0
DIARRHEA: 0
VOICE CHANGE: 0

## 2024-10-11 NOTE — PROGRESS NOTES
Review of Systems   Constitutional:  Negative for appetite change, chills, diaphoresis, fatigue, fever and unexpected weight change.   HENT:   Negative for hearing loss, lump/mass, mouth sores, nosebleeds, sore throat, tinnitus, trouble swallowing and voice change.    Eyes:  Negative for eye problems and icterus.   Respiratory:  Negative for chest tightness, cough, hemoptysis, shortness of breath and wheezing.    Cardiovascular:  Negative for chest pain, leg swelling and palpitations.   Gastrointestinal:  Positive for constipation (occasionally). Negative for abdominal distention, abdominal pain, blood in stool, diarrhea, nausea, rectal pain and vomiting.   Endocrine: Negative for hot flashes.   Genitourinary:  Negative for bladder incontinence, difficulty urinating, dyspareunia, dysuria, frequency, hematuria, menstrual problem, nocturia, pelvic pain, vaginal bleeding and vaginal discharge.    Musculoskeletal:  Positive for back pain (chronic). Negative for arthralgias, flank pain, gait problem, myalgias, neck pain and neck stiffness.   Skin:  Negative for itching, rash and wound.   Neurological:  Negative for dizziness, extremity weakness, gait problem, headaches, light-headedness, numbness, seizures and speech difficulty.   Hematological:  Negative for adenopathy. Does not bruise/bleed easily.   Psychiatric/Behavioral:  Negative for confusion, decreased concentration, depression, sleep disturbance and suicidal ideas. The patient is not nervous/anxious.        
reports / denies to have any fevers or chills.     She reports / denies to have any intermenstrual bleeding or current vaginal discharge    She denies to have any abdominal or pelvic pain symptoms. She denies taking any chronic analgesic medications. She reports to tolerate a regular diet, without nausea, anorexia, abdominal bloating or early satiety. She reports to have normal bowel movements, without constipation or diarrhea. She denies to have any abnormal  symptoms. She denies to have any symptoms of fatigue. She denies to have any fevers or chills. She denies to have any intermenstrual bleeding or current vaginal discharge          CHART PREP FROM 08/02/24 TO PRESENT (these details have NOT been placed into each appropriate section. Included here to reference during visit)    Telephone note, Marleny Paris RD (08/06/24 to 09/09/24)    Visit note, Dr. Sergio Brown (08/09/24 to 10/04/24) [Medical Oncology]  Visit on 08/09/24  Patient has not started taking Zejula yet. She is very concerned about the side effects.   Visit on 10/04/24  Patient is now on Zejula 200 mg p.o. daily   Patient wants to know the status of the disease based on which she can will make a decision when to continue Zejula or not. Will order CT scans     Visit note, Dr. Gagandeep Melgoza (09/20/24) [PCP]     (08/02/24): 26 => 37 (09/30/24)    TSH (09/23/24): Normal  HgBA1C (09/23/24): Normal  Vitamin B12 (09/23/24): Normal    CBC (09/30/24): Normal Hemoglobin 12.6   CMP (09/30/24): Gluose 110 (H) and AlkPhos 110 (H). Normal Creatinine 0.9    FROM LAST VISIT: Need to confirm on date of service  The patient reports she continues to experience weight loss that she associates with her chemotherapy treatment. She reports she tolerates a regular diet with anorexia. She denies to have symptoms of nausea, abdominal bloating or early satiety.     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 She reports 
entire discussion has been held in the presence of the patient's  (Chris, 87yo)    I have extensively reviewed the medical records. A discussion was held regarding Melissa Bo's diagnosis and plan of care. She verbalized a good understanding.    Suspicion for gynecologic malignancy (Diagnosed 2024)  The patient and her family have been counseled regarding diagnosis. All questions were answered to her apparent satisfaction.   The patient has been on Zejula (200 mg daily) with minimal side effects (patient reports to have symptoms of dizziness and difficulty falling asleep).   Requisitions for CMP, CBC, and Ca125 were placed by Dr. Brown.   CT scan scheduled for 11/11/24. Address information provided at today's visit.   Return to the office as needed for any abnormal symptoms. Return precautions including, but not limited to abnormal vaginal bleeding, abnormal GI or  symptoms, and abdominal / pelvic pain have been reviewed.      2.   Dizziness, Imbalance, History of PACs/PVCs  The patient and her family were counseled regarding her abnormal symptoms of dizziness and imbalance.   The patient was recommended to follow up with Dr. Dodson [Cardiology] for CT head, stress test and further evaluation and management of symptoms.     3.   Pulmonary Embolism (Diagnosed 2024)  The patient and family have been counseled regarding her diagnosis. The importance of daily anti-coagulation has been reinforced with the patient today. She and her  verbalize a good understanding, without any specific questions.   Doppler ultrasound of bilateral lower extremities has been requested on last visit; however, this has not been completed.       4.   Memory Loss  I continue to remain concerned regarding the patient's memory loss. The patient's 87yo  also has carried some notable cognitive loss while in the office as well. The patient's daughter gently expresses concerns today as well. The patient and her

## 2024-10-25 DIAGNOSIS — C57.9 MALIGNANT NEOPLASM OF FEMALE GENITAL ORGAN (HCC): Primary | ICD-10-CM

## 2024-10-28 DIAGNOSIS — Z51.11 CHEMOTHERAPY MANAGEMENT, ENCOUNTER FOR: ICD-10-CM

## 2024-10-28 DIAGNOSIS — C56.1 MALIGNANT NEOPLASM OF RIGHT OVARY (HCC): ICD-10-CM

## 2024-10-28 DIAGNOSIS — C57.9 MALIGNANT NEOPLASM OF FEMALE GENITAL ORGAN (HCC): ICD-10-CM

## 2024-10-28 RX ORDER — METOPROLOL SUCCINATE 50 MG/1
50 TABLET, EXTENDED RELEASE ORAL DAILY
Qty: 30 TABLET | Refills: 0 | Status: SHIPPED | OUTPATIENT
Start: 2024-10-28

## 2024-10-31 DIAGNOSIS — C48.2 CANCER OF PERITONEUM (HCC): Primary | ICD-10-CM

## 2024-10-31 DIAGNOSIS — C56.1 MALIGNANT NEOPLASM OF RIGHT OVARY (HCC): ICD-10-CM

## 2024-11-05 ENCOUNTER — HOSPITAL ENCOUNTER (OUTPATIENT)
Age: 79
Discharge: HOME | End: 2024-11-05
Payer: MEDICARE

## 2024-11-05 DIAGNOSIS — C48.2 CANCER OF PERITONEUM (HCC): ICD-10-CM

## 2024-11-05 LAB
ALBUMIN SERPL-MCNC: 4.2 G/DL (ref 3.5–5.2)
ALBUMIN/GLOB SERPL: 1.8 {RATIO} (ref 1–2.5)
ALP SERPL-CCNC: 126 U/L (ref 35–104)
ALT SERPL-CCNC: 10 U/L (ref 5–33)
ANION GAP SERPL CALCULATED.3IONS-SCNC: 10 MMOL/L (ref 9–17)
AST SERPL-CCNC: 19 U/L
BASOPHILS # BLD: 0.1 K/UL (ref 0–0.2)
BASOPHILS NFR BLD: 2 % (ref 0–2)
BILIRUB SERPL-MCNC: 0.3 MG/DL (ref 0.3–1.2)
BUN SERPL-MCNC: 17 MG/DL (ref 8–23)
CALCIUM SERPL-MCNC: 9.3 MG/DL (ref 8.6–10.4)
CHLORIDE SERPL-SCNC: 104 MMOL/L (ref 98–107)
CO2 SERPL-SCNC: 27 MMOL/L (ref 20–31)
CREAT SERPL-MCNC: 1 MG/DL (ref 0.5–0.9)
EOSINOPHIL # BLD: 0.1 K/UL (ref 0–0.4)
EOSINOPHILS RELATIVE PERCENT: 2 % (ref 1–4)
ERYTHROCYTE [DISTWIDTH] IN BLOOD BY AUTOMATED COUNT: 17.2 % (ref 12.5–15.4)
GFR, ESTIMATED: 57 ML/MIN/1.73M2
GLUCOSE SERPL-MCNC: 99 MG/DL (ref 70–99)
HCT VFR BLD AUTO: 36.8 % (ref 36–46)
HGB BLD-MCNC: 12.7 G/DL (ref 12–16)
LYMPHOCYTES NFR BLD: 1.5 K/UL (ref 1–4.8)
LYMPHOCYTES RELATIVE PERCENT: 23 % (ref 24–44)
MCH RBC QN AUTO: 35.2 PG (ref 26–34)
MCHC RBC AUTO-ENTMCNC: 34.4 G/DL (ref 31–37)
MCV RBC AUTO: 102.3 FL (ref 80–100)
MONOCYTES NFR BLD: 0.7 K/UL (ref 0.1–1.2)
MONOCYTES NFR BLD: 11 % (ref 2–11)
NEUTROPHILS NFR BLD: 62 % (ref 36–66)
NEUTS SEG NFR BLD: 4.1 K/UL (ref 1.8–7.7)
PLATELET # BLD AUTO: 211 K/UL (ref 140–450)
PMV BLD AUTO: 10 FL (ref 6–12)
POTASSIUM SERPL-SCNC: 4.2 MMOL/L (ref 3.7–5.3)
PROT SERPL-MCNC: 6.6 G/DL (ref 6.4–8.3)
RBC # BLD AUTO: 3.6 M/UL (ref 4–5.2)
SODIUM SERPL-SCNC: 141 MMOL/L (ref 135–144)
WBC OTHER # BLD: 6.5 K/UL (ref 3.5–11)

## 2024-11-05 PROCEDURE — 85025 COMPLETE CBC W/AUTO DIFF WBC: CPT

## 2024-11-05 PROCEDURE — 36415 COLL VENOUS BLD VENIPUNCTURE: CPT

## 2024-11-05 PROCEDURE — 80053 COMPREHEN METABOLIC PANEL: CPT

## 2024-11-11 ENCOUNTER — HOSPITAL ENCOUNTER (OUTPATIENT)
Dept: CT IMAGING | Age: 79
Discharge: HOME | End: 2024-11-13
Attending: INTERNAL MEDICINE
Payer: MEDICARE

## 2024-11-11 DIAGNOSIS — Z51.11 CHEMOTHERAPY MANAGEMENT, ENCOUNTER FOR: ICD-10-CM

## 2024-11-11 DIAGNOSIS — C56.1 MALIGNANT NEOPLASM OF RIGHT OVARY (HCC): ICD-10-CM

## 2024-11-11 DIAGNOSIS — C57.9 MALIGNANT NEOPLASM OF FEMALE GENITAL ORGAN (HCC): ICD-10-CM

## 2024-11-11 LAB — CREAT BLD-MCNC: 1.1 MG/DL (ref 0.6–1.4)

## 2024-11-11 PROCEDURE — 6360000004 HC RX CONTRAST MEDICATION: Performed by: INTERNAL MEDICINE

## 2024-11-11 PROCEDURE — 71260 CT THORAX DX C+: CPT

## 2024-11-11 PROCEDURE — 82565 ASSAY OF CREATININE: CPT

## 2024-11-11 PROCEDURE — 2580000003 HC RX 258: Performed by: INTERNAL MEDICINE

## 2024-11-11 RX ORDER — SODIUM CHLORIDE 0.9 % (FLUSH) 0.9 %
10 SYRINGE (ML) INJECTION ONCE
Status: COMPLETED | OUTPATIENT
Start: 2024-11-11 | End: 2024-11-11

## 2024-11-11 RX ORDER — 0.9 % SODIUM CHLORIDE 0.9 %
80 INTRAVENOUS SOLUTION INTRAVENOUS ONCE
Status: COMPLETED | OUTPATIENT
Start: 2024-11-11 | End: 2024-11-11

## 2024-11-11 RX ORDER — IOPAMIDOL 755 MG/ML
100 INJECTION, SOLUTION INTRAVASCULAR
Status: COMPLETED | OUTPATIENT
Start: 2024-11-11 | End: 2024-11-11

## 2024-11-11 RX ADMIN — SODIUM CHLORIDE 80 ML: 9 INJECTION, SOLUTION INTRAVENOUS at 11:16

## 2024-11-11 RX ADMIN — SODIUM CHLORIDE, PRESERVATIVE FREE 10 ML: 5 INJECTION INTRAVENOUS at 11:15

## 2024-11-11 RX ADMIN — IOPAMIDOL 100 ML: 755 INJECTION, SOLUTION INTRAVENOUS at 11:15

## 2024-11-12 ENCOUNTER — HOSPITAL ENCOUNTER (OUTPATIENT)
Dept: INTERVENTIONAL RADIOLOGY/VASCULAR | Age: 79
Discharge: HOME | End: 2024-11-14
Payer: MEDICARE

## 2024-11-12 VITALS — OXYGEN SATURATION: 97 %

## 2024-11-12 DIAGNOSIS — C48.2 CANCER OF PERITONEUM (HCC): ICD-10-CM

## 2024-11-12 DIAGNOSIS — C56.1 MALIGNANT NEOPLASM OF RIGHT OVARY (HCC): ICD-10-CM

## 2024-11-12 PROCEDURE — 77001 FLUOROGUIDE FOR VEIN DEVICE: CPT

## 2024-11-12 PROCEDURE — 36590 REMOVAL TUNNELED CV CATH: CPT

## 2024-11-12 NOTE — PROGRESS NOTES
Here for port removal. Supine on table. Dr Adalgisa Ochoa present. Right chest is prepped, draped and numbed. Port removed without issues. Port intact. Area sutured and skin glue used. Patient tolerated well. Discharged to home.

## 2024-11-12 NOTE — BRIEF OP NOTE
Brief Postoperative Note    Melissa Bo  YOB: 1945  [id number]    Pre-operative Diagnosis: Malignant neoplasm of female genital organ    Post-operative Diagnosis: Same    Procedure: Port removal    Anesthesia: Local    Surgeons/Assistants: MD Don    Estimated Blood Loss: less than 50     Complications: None    Specimens: Was Obtained:     Findings: Successful removal of right chest port.     Electronically signed by CHUCK Leon on 11/12/2024 at 2:04 PM

## 2024-11-14 ENCOUNTER — TELEPHONE (OUTPATIENT)
Dept: ONCOLOGY | Age: 79
End: 2024-11-14

## 2024-11-14 NOTE — TELEPHONE ENCOUNTER
[facility]- MEDICAL NUTRITION THERAPY     Visit Type: Follow-up     NUTRITION RECOMMENDATIONS / MONITORING / EVALUATION  Continue Regular diet. Encouraged intake as tolerated and continued use of oral nutrition supplements as needed. Writer mailed Ensure coupons as requested.    Will periodically monitor PO tolerance, adequacy of intake, weight, and care plans.     Subjective/Current Data:  Melissa Bo is a 79 y.o. 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   Chart reviewed and called patient for nutrition follow-up. Pt reports her appetite is pretty good at present- typically eats better in the morning /afternoon than in the evening. Still uses Ensure shakes as needed. Requesting more coupons- writer placed in mail today. States weight has been stable at approx 125 lbs. Pt c/o constipation at times still; takes bowel medication which helps.     Objective Data:  Patient Active Problem List    Diagnosis Date Noted    Influenza vaccination declined 05/26/2024    Pneumococcal vaccination declined 05/26/2024    Tetanus, diphtheria, and acellular pertussis (Tdap) vaccination declined 05/26/2024    COVID-19 virus antigen vaccine declined 05/26/2024    Memory loss 05/26/2024    Malignant neoplasm of female genital organ (HCC) 05/26/2024    Peritoneal carcinomatosis (HCC) 05/26/2024    Pulmonary embolism (HCC) 05/26/2024    Generalized osteoarthritis 05/09/2024    Postmenopausal bleeding 05/09/2024    Vitamin D deficiency 05/09/2024    Cancer of peritoneum (HCC) 02/23/2024    Other specified conditions associated with female genital organs and menstrual cycle 01/25/2024    Pelvic mass 01/25/2024    Osteoarthritis of right hip 05/16/2022    PAT (paroxysmal atrial tachycardia) ([facility]) 12/23/2020    Cyst of skin 09/03/2019    Former cigarette smoker 06/12/2019    Lightheadedness 06/12/2019    Intermittent palpitations

## 2024-11-15 ENCOUNTER — TELEPHONE (OUTPATIENT)
Dept: ONCOLOGY | Age: 79
End: 2024-11-15

## 2024-11-15 ENCOUNTER — TELEPHONE (OUTPATIENT)
Dept: INFUSION THERAPY | Age: 79
End: 2024-11-15

## 2024-11-15 ENCOUNTER — OFFICE VISIT (OUTPATIENT)
Dept: ONCOLOGY | Age: 79
End: 2024-11-15
Payer: MEDICARE

## 2024-11-15 VITALS
OXYGEN SATURATION: 97 % | HEART RATE: 98 BPM | TEMPERATURE: 96.8 F | SYSTOLIC BLOOD PRESSURE: 176 MMHG | RESPIRATION RATE: 18 BRPM | BODY MASS INDEX: 21.08 KG/M2 | WEIGHT: 126.7 LBS | DIASTOLIC BLOOD PRESSURE: 107 MMHG

## 2024-11-15 DIAGNOSIS — R97.1 ELEVATED CANCER ANTIGEN 125 (CA 125): ICD-10-CM

## 2024-11-15 DIAGNOSIS — Z79.899 HIGH RISK MEDICATION USE: ICD-10-CM

## 2024-11-15 DIAGNOSIS — C56.1 MALIGNANT NEOPLASM OF RIGHT OVARY (HCC): ICD-10-CM

## 2024-11-15 DIAGNOSIS — C57.9 MALIGNANT NEOPLASM OF FEMALE GENITAL ORGAN (HCC): Primary | ICD-10-CM

## 2024-11-15 PROCEDURE — 99211 OFF/OP EST MAY X REQ PHY/QHP: CPT | Performed by: INTERNAL MEDICINE

## 2024-11-15 PROCEDURE — 3080F DIAST BP >= 90 MM HG: CPT | Performed by: INTERNAL MEDICINE

## 2024-11-15 PROCEDURE — 3077F SYST BP >= 140 MM HG: CPT | Performed by: INTERNAL MEDICINE

## 2024-11-15 PROCEDURE — 1123F ACP DISCUSS/DSCN MKR DOCD: CPT | Performed by: INTERNAL MEDICINE

## 2024-11-15 PROCEDURE — 99214 OFFICE O/P EST MOD 30 MIN: CPT | Performed by: INTERNAL MEDICINE

## 2024-11-15 PROCEDURE — 1126F AMNT PAIN NOTED NONE PRSNT: CPT | Performed by: INTERNAL MEDICINE

## 2024-11-15 RX ORDER — METOPROLOL SUCCINATE 100 MG/1
100 TABLET, EXTENDED RELEASE ORAL DAILY
Qty: 30 TABLET | Refills: 5 | Status: SHIPPED | OUTPATIENT
Start: 2024-11-15

## 2024-11-15 NOTE — TELEPHONE ENCOUNTER
Instructions   from Dr. Sergio Brown MD    Additional tempus test   Rv based on results   Needs apt with GynOnc     Triage notified of additional tempus  Rv based on tempus result; AVS given to triage to track  Pt will call to schedule Gyn Onc appt

## 2024-11-15 NOTE — TELEPHONE ENCOUNTER
Name: Melissa Bo  : 1945  MRN: [id number]    Oncology Navigation Follow-Up Note    Contact Type:  Medical Oncology    Notes: Pt @ [facility] for Dr. Brown f/u.  Met w/pt & pt's family prior to appt.  Pt denied questions/concerns for writer.  Encouraged to contact writer prn.      Dr. Brown completed f/u & alerted writer pt w/progression of disease.  Dr. Brown stated additional molecular testing ordered & pt will f/u once results completed.  Will continue to follow.      Electronically signed by Kim Rainey RN on 11/15/2024 at 12:35 PM

## 2024-11-15 NOTE — PROGRESS NOTES
Melissa Bo                                                                                                                  11/15/2024  MRN:   [id number]  YOB: 1945  PCP:                           Gagandeep Melgoza DO  Referring Physician: No ref. provider found  Treating Physician Name: KIERSTEN RUELAS MD      Reason for visit:  Chief Complaint   Patient presents with    Follow-up     Review status of disease     Other     Dizziness   Cramping in abdomen     Results     CT     Discuss Labs   Patient presents to the clinic for toxicity check and to discuss results of lab work-up, imaging studies and further treatment plan.    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  Patient has been tolerating niraparib without any unexpected or severe side effects.  Denies abdominal pain denies nausea vomiting fever chills.  CT scan showed disease progression.      During this visit patient's allergy, social, medical, surgical history and medications were reviewed and updated.    Summary of Case/History:  Melissa Bo a 79 y.o.female who presents to the clinic to establish care for further workup and evaluation for recently diagnosed gynecological cancer likely right ovary primary with peritoneal metastasis.  Patient presents to the office accompanied by her  and daughter.  Patient was initially seen by gynecology at [facility] due to concerns regarding postmenopausal bleeding.  She

## 2024-11-15 NOTE — TELEPHONE ENCOUNTER
Dr. Brown asked for HER-2 by IHC and FOLRI to be added to [facility]. Writer emailed Kirsty Gallegos liaison, requesting these tests.

## 2024-11-20 ENCOUNTER — TELEPHONE (OUTPATIENT)
Dept: ONCOLOGY | Age: 79
End: 2024-11-20

## 2024-11-20 NOTE — TELEPHONE ENCOUNTER
Name: Melissa Bo  : 1945  MRN: [id number]    Oncology Navigation Follow-Up Note    Contact Type:  Telephone    Notes: Dr. Brown alerted writer requested pt f/u w/gyn onc r/t c/o vaginal discharge.  Spoke w/pt inquired on scheduling f/u w/Dr. Patrick or Adriana GUZMAN, pt stated forgot.  Inquired if vaginal discharge continues, pt stated yes.  Instructed pt writer will update [facility] gyn onc office & request contact pt to schedule appt.  Pt verbalized understanding & denied questions/concerns.  Encouraged to contact writer prn.  Will continue to follow.      Electronically signed by Kim Rainey RN on 2024 at 2:58 PM

## 2024-11-22 ENCOUNTER — HOSPITAL ENCOUNTER (OUTPATIENT)
Age: 79
Setting detail: SPECIMEN
Discharge: HOME | End: 2024-11-22

## 2024-11-22 ENCOUNTER — OFFICE VISIT (OUTPATIENT)
Dept: GYNECOLOGIC ONCOLOGY | Age: 79
End: 2024-11-22
Payer: MEDICARE

## 2024-11-22 VITALS
OXYGEN SATURATION: 98 % | DIASTOLIC BLOOD PRESSURE: 80 MMHG | SYSTOLIC BLOOD PRESSURE: 150 MMHG | BODY MASS INDEX: 20.25 KG/M2 | HEIGHT: 66 IN | WEIGHT: 126 LBS | HEART RATE: 76 BPM

## 2024-11-22 DIAGNOSIS — N89.8 VAGINAL DISCHARGE: Primary | ICD-10-CM

## 2024-11-22 DIAGNOSIS — N89.8 VAGINAL DISCHARGE: ICD-10-CM

## 2024-11-22 LAB
CANDIDA SPECIES: NEGATIVE
GARDNERELLA VAGINALIS: POSITIVE
SOURCE: ABNORMAL
TRICHOMONAS: NEGATIVE

## 2024-11-22 PROCEDURE — 3077F SYST BP >= 140 MM HG: CPT | Performed by: PHYSICIAN ASSISTANT

## 2024-11-22 PROCEDURE — 3079F DIAST BP 80-89 MM HG: CPT | Performed by: PHYSICIAN ASSISTANT

## 2024-11-22 PROCEDURE — 1159F MED LIST DOCD IN RCRD: CPT | Performed by: PHYSICIAN ASSISTANT

## 2024-11-22 PROCEDURE — 99214 OFFICE O/P EST MOD 30 MIN: CPT | Performed by: PHYSICIAN ASSISTANT

## 2024-11-22 PROCEDURE — 1123F ACP DISCUSS/DSCN MKR DOCD: CPT | Performed by: PHYSICIAN ASSISTANT

## 2024-11-22 ASSESSMENT — ENCOUNTER SYMPTOMS
BACK PAIN: 1
RESPIRATORY NEGATIVE: 1
GASTROINTESTINAL NEGATIVE: 1
EYE PROBLEMS: 1

## 2024-11-22 NOTE — PROGRESS NOTES
Review of Systems   Constitutional:  Positive for appetite change (increased).   HENT:  Negative.     Eyes:  Positive for eye problems (difficult to focus).   Respiratory: Negative.     Cardiovascular:  Positive for palpitations (not new).   Gastrointestinal: Negative.    Endocrine: Negative.    Genitourinary:  Positive for vaginal discharge.    Musculoskeletal:  Positive for back pain (not new).   Skin: Negative.    Neurological:  Positive for dizziness.   Hematological: Negative.       
dizziness, negative weakness  Behavior/Psych: negative depression, negative anxiety    OBSTETRICAL HISTORY:  OB History   No obstetric history on file.       PAST MEDICAL HISTORY:      Diagnosis Date    Hearing loss     Hypercholesteremia     Hypertension     Osteoarthritis     PAC (premature atrial contraction)     Pelvic mass     PVC's (premature ventricular contractions)     Dr. Luna    Scoliosis        PAST SURGICAL HISTORY:                                                                    Procedure Laterality Date    IR PORT PLACEMENT < 5 YEARS Right 02/28/2024    IR PORT PLACEMENT < 5 YEARS  2/28/2024    IR PORT PLACEMENT < 5 YEARS 2/28/2024 Trent Hammond MD [facility]    TOTAL HIP ARTHROPLASTY      left       MEDICATIONS:  Current Outpatient Medications   Medication Sig Dispense Refill    metoprolol succinate (TOPROL XL) 100 MG extended release tablet Take 1 tablet by mouth daily 30 tablet 5    omeprazole (PRILOSEC) 20 MG delayed release capsule Take 1 capsule by mouth in the morning and at bedtime 180 capsule 0    ELIQUIS 5 MG TABS tablet TAKE 1 TABLET BY MOUTH 2 TIMES A  tablet 0    B Complex Vitamins (VITAMIN-B COMPLEX PO) Take 1 capsule by mouth daily      b complex vitamins capsule Take 1 capsule by mouth daily 90 capsule 1    Handicap Placard MISC by Does not apply route Expiration date: 5/1/2026 1 each 0    sennosides-docusate sodium (SENOKOT-S) 8.6-50 MG tablet Take 1 tablet by mouth daily 30 tablet 1    apixaban starter pack (ELIQUIS DVT/PE STARTER PACK) 5 MG TBPK tablet Take 1 tablet by mouth See Admin Instructions 74 tablet 0    atorvastatin (LIPITOR) 20 MG tablet TAKE 1 TABLET BY MOUTH ONE TIME A DAY  10    DILT- MG extended release capsule TAKE 1 CAPSULE BY MOUTH ONE TIME A DAY  11    aspirin 81 MG tablet Take 1 tablet by mouth daily      calcium-vitamin D (OSCAL-500) 500-200 MG-UNIT per tablet Take 1 tablet by mouth daily      metoprolol succinate (TOPROL XL)

## 2024-11-25 RX ORDER — METRONIDAZOLE 500 MG/1
500 TABLET ORAL 2 TIMES DAILY
Qty: 14 TABLET | Refills: 0 | Status: SHIPPED | OUTPATIENT
Start: 2024-11-25 | End: 2024-12-02

## 2024-11-26 ENCOUNTER — TELEPHONE (OUTPATIENT)
Facility: HOSPITAL | Age: 79
End: 2024-11-26

## 2024-11-26 NOTE — TELEPHONE ENCOUNTER
Called patient and left a message asking for a return call.  Re-enrolment is her and I need permission and a signature for the application for free Zejula.

## 2024-12-05 ENCOUNTER — HOSPITAL ENCOUNTER (OUTPATIENT)
Dept: MRI IMAGING | Age: 79
Discharge: HOME | End: 2024-12-07
Attending: INTERNAL MEDICINE
Payer: MEDICARE

## 2024-12-05 DIAGNOSIS — C56.1 MALIGNANT NEOPLASM OF RIGHT OVARY (HCC): ICD-10-CM

## 2024-12-05 DIAGNOSIS — Z79.899 HIGH RISK MEDICATION USE: ICD-10-CM

## 2024-12-05 DIAGNOSIS — R97.1 ELEVATED CANCER ANTIGEN 125 (CA 125): ICD-10-CM

## 2024-12-05 DIAGNOSIS — C57.9 MALIGNANT NEOPLASM OF FEMALE GENITAL ORGAN (HCC): ICD-10-CM

## 2024-12-05 LAB
CREAT SERPL-MCNC: 0.8 MG/DL (ref 0.5–0.9)
GFR, ESTIMATED: 74 ML/MIN/1.73M2

## 2024-12-05 PROCEDURE — 82565 ASSAY OF CREATININE: CPT

## 2024-12-05 PROCEDURE — 6360000004 HC RX CONTRAST MEDICATION: Performed by: INTERNAL MEDICINE

## 2024-12-05 PROCEDURE — 74183 MRI ABD W/O CNTR FLWD CNTR: CPT

## 2024-12-05 PROCEDURE — 2580000003 HC RX 258: Performed by: INTERNAL MEDICINE

## 2024-12-05 PROCEDURE — A9579 GAD-BASE MR CONTRAST NOS,1ML: HCPCS | Performed by: INTERNAL MEDICINE

## 2024-12-05 RX ORDER — SODIUM CHLORIDE 0.9 % (FLUSH) 0.9 %
10 SYRINGE (ML) INJECTION ONCE
Status: COMPLETED | OUTPATIENT
Start: 2024-12-05 | End: 2024-12-05

## 2024-12-05 RX ORDER — 0.9 % SODIUM CHLORIDE 0.9 %
40 INTRAVENOUS SOLUTION INTRAVENOUS ONCE
Status: COMPLETED | OUTPATIENT
Start: 2024-12-05 | End: 2024-12-05

## 2024-12-05 RX ADMIN — GADOTERIDOL 11 ML: 279.3 INJECTION, SOLUTION INTRAVENOUS at 08:55

## 2024-12-05 RX ADMIN — SODIUM CHLORIDE, PRESERVATIVE FREE 10 ML: 5 INJECTION INTRAVENOUS at 08:56

## 2024-12-05 RX ADMIN — SODIUM CHLORIDE 40 ML: 9 INJECTION, SOLUTION INTRAVENOUS at 08:55

## 2024-12-12 ENCOUNTER — HOSPITAL ENCOUNTER (OUTPATIENT)
Age: 79
Setting detail: SPECIMEN
Discharge: HOME | End: 2024-12-12

## 2024-12-13 ENCOUNTER — TELEPHONE (OUTPATIENT)
Dept: ONCOLOGY | Age: 79
End: 2024-12-13

## 2024-12-13 ENCOUNTER — TELEPHONE (OUTPATIENT)
Dept: INFUSION THERAPY | Age: 79
End: 2024-12-13

## 2024-12-13 NOTE — TELEPHONE ENCOUNTER
Everette Duncan from [facility] called stating that they are having a hard time getting tissue sample for Her2 by IHC and FOLR1 that Dr. Brown was asking for.  He stated that he needs an ordr for lab.  RN sent a [employer] order form to him for these tests and asked if this will work.  [facility] was [facility] to [email or web address]

## 2024-12-13 NOTE — TELEPHONE ENCOUNTER
Name: Melissa Bo  : 1945  MRN: [id number]    Oncology Navigation Follow-Up Note    Contact Type:  Telephone    Notes: Spoke w/Yemi, [facility] liaison, inquired on Her2 by IHC & FOLR1 testing.  Yemi stated will look into & call back.    Jon called back stating [facility] labs stated no order.  Yemi stated order refaxed today & confirmed received.  Will continue to follow.      Electronically signed by Kim Rainey RN on 2024 at 2:08 PM

## 2024-12-20 ENCOUNTER — TELEPHONE (OUTPATIENT)
Dept: ONCOLOGY | Age: 79
End: 2024-12-20

## 2024-12-20 NOTE — TELEPHONE ENCOUNTER
Name: Melissa Bo  : 1945  MRN: [id number]    Oncology Navigation Follow-Up Note    Contact Type:  Telephone    Notes: Obtained HER2 & FOLR1 results via [facility] portal.  Dr. Brown updated on results &  MRI abd results.  Dr. Brown stated may notify pt of results & tx plan chemotherapy if pt agreeable.  Attempted to contact pt, no answer, VM left notified results completed, writer spoke w/Dr. Brown, & requested call back.  Will continue to follow.        Electronically signed by Kim Rainey RN on 2024 at 8:46 AM

## 2024-12-20 NOTE — TELEPHONE ENCOUNTER
Name: Melissa Bo  : 1945  MRN: [id number]    Oncology Navigation Follow-Up Note    Contact Type:  Telephone    Notes: Spoke w/pt updated on MRI abd, HER2, & FOLR1 results & notified Dr. Brown stated tx would be chemotherapy.  Pt w/multiple questions.  Encouraged pt to schedule Dr. Brown f/u.  Pt stated going OOT  & returns in 3 weeks.  Offered to coordinate  f/u, pt agreeable.  Spoke w/Estella LINK, [facility] , updated on pt & requested appt.  Pt scheduled  @ 1000.  Pt updated on  appt details & verbalized understanding.  Pt c/o continued vaginal discharge & stated after reviewing flagyl online believes wrong medication to treat discharge.  Encouraged pt to contact  gyn onc to update on continued discharge.  Pt verbalized understanding & agreeable.  Will continue to follow.      Electronically signed by Kim Rainey RN on 2024 at 2:59 PM

## 2024-12-27 ENCOUNTER — TELEPHONE (OUTPATIENT)
Dept: ONCOLOGY | Age: 79
End: 2024-12-27

## 2024-12-27 ENCOUNTER — TELEPHONE (OUTPATIENT)
Dept: GYNECOLOGIC ONCOLOGY | Age: 79
End: 2024-12-27

## 2024-12-27 ENCOUNTER — OFFICE VISIT (OUTPATIENT)
Dept: ONCOLOGY | Age: 79
End: 2024-12-27
Payer: MEDICARE

## 2024-12-27 VITALS
SYSTOLIC BLOOD PRESSURE: 147 MMHG | DIASTOLIC BLOOD PRESSURE: 82 MMHG | HEART RATE: 82 BPM | WEIGHT: 138 LBS | OXYGEN SATURATION: 97 % | RESPIRATION RATE: 18 BRPM | BODY MASS INDEX: 22.27 KG/M2 | TEMPERATURE: 96.8 F

## 2024-12-27 DIAGNOSIS — Z79.899 HIGH RISK MEDICATION USE: ICD-10-CM

## 2024-12-27 DIAGNOSIS — C56.1 MALIGNANT NEOPLASM OF RIGHT OVARY (HCC): Primary | ICD-10-CM

## 2024-12-27 DIAGNOSIS — C48.2 CANCER OF PERITONEUM (HCC): ICD-10-CM

## 2024-12-27 PROCEDURE — 1126F AMNT PAIN NOTED NONE PRSNT: CPT | Performed by: INTERNAL MEDICINE

## 2024-12-27 PROCEDURE — 3079F DIAST BP 80-89 MM HG: CPT | Performed by: INTERNAL MEDICINE

## 2024-12-27 PROCEDURE — 99214 OFFICE O/P EST MOD 30 MIN: CPT | Performed by: INTERNAL MEDICINE

## 2024-12-27 PROCEDURE — 99211 OFF/OP EST MAY X REQ PHY/QHP: CPT | Performed by: INTERNAL MEDICINE

## 2024-12-27 PROCEDURE — 1123F ACP DISCUSS/DSCN MKR DOCD: CPT | Performed by: INTERNAL MEDICINE

## 2024-12-27 PROCEDURE — 3077F SYST BP >= 140 MM HG: CPT | Performed by: INTERNAL MEDICINE

## 2024-12-27 RX ORDER — METRONIDAZOLE 500 MG/1
500 TABLET ORAL 2 TIMES DAILY
Qty: 14 TABLET | Refills: 0 | Status: SHIPPED | OUTPATIENT
Start: 2024-12-27 | End: 2025-01-03

## 2024-12-27 NOTE — TELEPHONE ENCOUNTER
Name: Melissa Bo  : 1945  MRN: [id number]    Oncology Navigation Follow-Up Note    Contact Type:  Medical Oncology    Notes: Pt @ [facility] for Dr. Brown f/u.  Dr. Brown completed f/u & requested pt scheduled for gyn onc f/u r/t continued vaginal discharge.   gyn onc office contact # given to pt's daughter.  Daughter stated will assist pt scheduling appt.  Will continue to follow.      Electronically signed by Kim Rainey RN on 2024 at 12:30 PM

## 2024-12-27 NOTE — PROGRESS NOTES
tree: Unremarkable Pancreas: Demonstrates a normal signal intensity without evidence of ductal dilatation. No discrete lesion. Spleen: Unremarkable Kidneys and ureters: Unremarkable Adrenal glands: Unremarkable Lymph nodes: No enlarged lymph nodes Bowel: The visualized bowel is within normal limits. Bone marrow: Within normal limits Lower chest: Unremarkable Vasculature: Patent portal venous system Miscellaneous: Nothing significant     1. Multiple lesions seen on recent CT examination correspond to peritoneal deposits have progressed in size when compared to older exam dated 04/25/2024.  The possibility of intraparenchymal hepatic lesions is felt to be less likely.        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  Reviewed results of MRI abdomen which confirmed liver metastases also shows progressive disease  IHC for FO L R 1 negative.  IHC negative for HER2  Discussed treatment options which is very much limited to cytotoxic therapy at this point.  Patient very clear in her goals that she does not want to go back on chemo.  Understands disease progression.  Patient however is interested in continued surveillance.  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  Continue Eliquis.  Patient has hypercoagulable state due to underlying malignancy.  Patient had pulmonary embolism in April 2024  Patient was also seen by dietitian.  Patient will be taking nutritional supplements.  NCCN guidelines were

## 2024-12-27 NOTE — TELEPHONE ENCOUNTER
Patient called office requesting to speak to a provider. Had appointment with Dr. Brown, and would like to speak to provider about visit.     Patient also requested prescription for vaginitis medication, states she has received medication previously.

## 2024-12-27 NOTE — TELEPHONE ENCOUNTER
Instructions   from Dr. Sergio Brown MD    Rv in 3 months with labs on rv     RV scheduled 3/28/25 at 11:30 am

## 2024-12-27 NOTE — PROGRESS NOTES
Talked to patient, she wants to try another course of oral metronidazole.  She may just have a cancerous inflammatory discharge from uterus, adnexa?  She will fu with me or Dr Patrick in the weeks ahead  She will continue treatment with Dr Brown for now    JULIA Cole MD

## 2025-01-02 ENCOUNTER — TELEPHONE (OUTPATIENT)
Dept: GYNECOLOGIC ONCOLOGY | Age: 80
End: 2025-01-02

## 2025-01-02 NOTE — TELEPHONE ENCOUNTER
Patient called and left voicemail stating she wanted to cancel 1/3/25 appt due to taking a holistic route and will call office for future appointments if desired.

## 2025-01-07 ENCOUNTER — TELEPHONE (OUTPATIENT)
Dept: ONCOLOGY | Age: 80
End: 2025-01-07

## 2025-01-07 NOTE — TELEPHONE ENCOUNTER
Name: Melissa Bo  : 1945  MRN: [id number]    Oncology Navigation Follow-Up Note    Contact Type:  Telephone    Notes: Upon review of chart note pt canceled gyn onc f/u.  Dr. Brown updated.  Dr. Brown stated pt declined tx & agreeable to continue w/surveillance.  Will continue to follow.      Electronically signed by Kim Rainey RN on 2025 at 3:40 PM

## 2025-01-08 ENCOUNTER — TELEPHONE (OUTPATIENT)
Dept: ONCOLOGY | Age: 80
End: 2025-01-08

## 2025-01-08 ENCOUNTER — TELEPHONE (OUTPATIENT)
Dept: GYNECOLOGIC ONCOLOGY | Age: 80
End: 2025-01-08

## 2025-01-08 DIAGNOSIS — R41.0 CONFUSION: Primary | ICD-10-CM

## 2025-01-08 NOTE — TELEPHONE ENCOUNTER
Name: Melissa Bo  : 1945  MRN: [id number]    Oncology Navigation Follow-Up Note    Contact Type:  Telephone    Notes: Pt's daughter called in stating pt noted w/confusion & recent fall.  Daughter inquired on UTI.  Notified daughter pt treated w/flagyl r/t vaginal overgrowth of bacteria & no urine tested.  Stressed importance f/u w/gyn onc r/t continued vaginal d/c after completion of flagyl.  Daughter stated will contact office now.  Will continue to follow.      Electronically signed by Kim Rainey RN on 2025 at 1:54 PM

## 2025-01-08 NOTE — TELEPHONE ENCOUNTER
Daughter Natalia called stating pt had fall a few days ago and confusion. She wants to know if it  is normal in late stage cancer to have dizziness & hallucinations. She states pt was supposed to come in for recheck after for vaginal discharge after treatment completed cancelled appt. She is wondering if this or UTI could be causing symptoms.Offered to schedule appt. She declines and states her mother will cancel it. She request call back to discuss concerns.

## 2025-01-08 NOTE — TELEPHONE ENCOUNTER
Called and spoke with his daughter Natalia  She is wondering what symptoms to expect as her mother condition progresses  She is curious if there is a UTI. Explained to Natalia I am happy to order urinalysis if patient is agreeable will place order in chart.    Otherwise emotional support given and also explained resources to help her and her mother during this time such as palliative care/hospice services.  Phone numbers and contact given to Edwina for support.  She voiced understanding and appreciation.  She will call us if she has any other questions concerns or concerns.

## 2025-01-13 ENCOUNTER — HOSPITAL ENCOUNTER (OUTPATIENT)
Age: 80
Discharge: HOME | End: 2025-01-13
Payer: MEDICARE

## 2025-01-13 DIAGNOSIS — R41.0 CONFUSION: ICD-10-CM

## 2025-01-13 LAB
BACTERIA URNS QL MICRO: ABNORMAL
BILIRUB UR QL STRIP: NEGATIVE
CHARACTER UR: ABNORMAL
CLARITY UR: CLEAR
COLOR UR: YELLOW
EPI CELLS #/AREA URNS HPF: ABNORMAL /HPF (ref 0–5)
GLUCOSE UR STRIP-MCNC: NEGATIVE MG/DL
HGB UR QL STRIP.AUTO: NEGATIVE
KETONES UR STRIP-MCNC: NEGATIVE MG/DL
LEUKOCYTE ESTERASE UR QL STRIP: NEGATIVE
MUCOUS THREADS URNS QL MICRO: ABNORMAL
NITRITE UR QL STRIP: NEGATIVE
PH UR STRIP: 6 [PH] (ref 5–8)
PROT UR STRIP-MCNC: ABNORMAL MG/DL
RBC #/AREA URNS HPF: ABNORMAL /HPF (ref 0–2)
SP GR UR STRIP: 1.02 (ref 1–1.03)
UROBILINOGEN UR STRIP-ACNC: NORMAL EU/DL (ref 0–1)
WBC #/AREA URNS HPF: ABNORMAL /HPF (ref 0–5)

## 2025-01-13 PROCEDURE — 81001 URINALYSIS AUTO W/SCOPE: CPT

## 2025-01-13 PROCEDURE — 87086 URINE CULTURE/COLONY COUNT: CPT

## 2025-01-14 LAB
MICROORGANISM SPEC CULT: NORMAL
SPECIMEN DESCRIPTION: NORMAL

## 2025-01-15 ENCOUNTER — TELEPHONE (OUTPATIENT)
Dept: ONCOLOGY | Age: 80
End: 2025-01-15

## 2025-01-15 NOTE — TELEPHONE ENCOUNTER
Name: Melissa Bo  : 1945  MRN: [id number]    Oncology Navigation Follow-Up Note    Contact Type:  Telephone    Notes: Pt's daughter called in stating noted urine culture negative via [employer].  Daughter stated pt's confusion continues & inquired what to do.  Daughter stated confusion affecting short term memory.  Encouraged to schedule appt w/pt's PCP.  Instructed writer may update Dr. Brown to inquire on CT or MRI imaging of brain.  Daughter stated \"she won't do it\".  Daughter stated pt declining further testing or tx.  Encouraged daughter to schedule appt w/PCP.  Daughter verbalized understanding & thanked writer.  Dr. Brown updated.  Will continue to follow.      Electronically signed by Kim Rainey RN on 1/15/2025 at 9:11 AM

## 2025-03-28 ENCOUNTER — OFFICE VISIT (OUTPATIENT)
Dept: ONCOLOGY | Age: 80
End: 2025-03-28
Payer: MEDICARE

## 2025-03-28 ENCOUNTER — TELEPHONE (OUTPATIENT)
Dept: ONCOLOGY | Age: 80
End: 2025-03-28

## 2025-03-28 ENCOUNTER — HOSPITAL ENCOUNTER (OUTPATIENT)
Age: 80
Discharge: HOME | End: 2025-03-28
Payer: MEDICARE

## 2025-03-28 VITALS
OXYGEN SATURATION: 97 % | RESPIRATION RATE: 18 BRPM | WEIGHT: 140.5 LBS | SYSTOLIC BLOOD PRESSURE: 139 MMHG | HEART RATE: 57 BPM | TEMPERATURE: 97.4 F | DIASTOLIC BLOOD PRESSURE: 97 MMHG | BODY MASS INDEX: 22.68 KG/M2

## 2025-03-28 DIAGNOSIS — C56.1 MALIGNANT NEOPLASM OF RIGHT OVARY: Primary | ICD-10-CM

## 2025-03-28 DIAGNOSIS — C56.1 MALIGNANT NEOPLASM OF RIGHT OVARY: ICD-10-CM

## 2025-03-28 DIAGNOSIS — Z79.899 HIGH RISK MEDICATION USE: ICD-10-CM

## 2025-03-28 DIAGNOSIS — C48.2 CANCER OF PERITONEUM (HCC): ICD-10-CM

## 2025-03-28 LAB
ALBUMIN SERPL-MCNC: 3.9 G/DL (ref 3.5–5.2)
ALBUMIN/GLOB SERPL: 1.6 {RATIO} (ref 1–2.5)
ALP SERPL-CCNC: 112 U/L (ref 35–104)
ALT SERPL-CCNC: 12 U/L (ref 5–33)
ANION GAP SERPL CALCULATED.3IONS-SCNC: 9 MMOL/L (ref 9–17)
AST SERPL-CCNC: 18 U/L
BASOPHILS # BLD: 0 K/UL (ref 0–0.2)
BASOPHILS NFR BLD: 0 % (ref 0–2)
BILIRUB SERPL-MCNC: 0.5 MG/DL (ref 0.3–1.2)
BUN SERPL-MCNC: 21 MG/DL (ref 8–23)
CALCIUM SERPL-MCNC: 8.6 MG/DL (ref 8.6–10.4)
CANCER AG125 SERPL-ACNC: 302 U/ML (ref 0–38)
CHLORIDE SERPL-SCNC: 110 MMOL/L (ref 98–107)
CO2 SERPL-SCNC: 27 MMOL/L (ref 20–31)
CREAT SERPL-MCNC: 0.8 MG/DL (ref 0.5–0.9)
EOSINOPHIL # BLD: 0.1 K/UL (ref 0–0.4)
EOSINOPHILS RELATIVE PERCENT: 2 % (ref 1–4)
ERYTHROCYTE [DISTWIDTH] IN BLOOD BY AUTOMATED COUNT: 13.6 % (ref 12.5–15.4)
GFR, ESTIMATED: 74 ML/MIN/1.73M2
GLUCOSE SERPL-MCNC: 86 MG/DL (ref 70–99)
HCT VFR BLD AUTO: 39.1 % (ref 36–46)
HGB BLD-MCNC: 13.2 G/DL (ref 12–16)
LYMPHOCYTES NFR BLD: 1.2 K/UL (ref 1–4.8)
LYMPHOCYTES RELATIVE PERCENT: 17 % (ref 24–44)
MCH RBC QN AUTO: 32.8 PG (ref 26–34)
MCHC RBC AUTO-ENTMCNC: 33.8 G/DL (ref 31–37)
MCV RBC AUTO: 97.2 FL (ref 80–100)
MONOCYTES NFR BLD: 0.5 K/UL (ref 0.1–1.2)
MONOCYTES NFR BLD: 7 % (ref 2–11)
NEUTROPHILS NFR BLD: 74 % (ref 36–66)
NEUTS SEG NFR BLD: 5.5 K/UL (ref 1.8–7.7)
PLATELET # BLD AUTO: 250 K/UL (ref 140–450)
PMV BLD AUTO: 11 FL (ref 6–12)
POTASSIUM SERPL-SCNC: 4.2 MMOL/L (ref 3.7–5.3)
PROT SERPL-MCNC: 6.3 G/DL (ref 6.4–8.3)
RBC # BLD AUTO: 4.02 M/UL (ref 4–5.2)
SODIUM SERPL-SCNC: 146 MMOL/L (ref 135–144)
WBC OTHER # BLD: 7.3 K/UL (ref 3.5–11)

## 2025-03-28 PROCEDURE — 3075F SYST BP GE 130 - 139MM HG: CPT | Performed by: INTERNAL MEDICINE

## 2025-03-28 PROCEDURE — 80053 COMPREHEN METABOLIC PANEL: CPT

## 2025-03-28 PROCEDURE — 86304 IMMUNOASSAY TUMOR CA 125: CPT

## 2025-03-28 PROCEDURE — 85025 COMPLETE CBC W/AUTO DIFF WBC: CPT

## 2025-03-28 PROCEDURE — 99211 OFF/OP EST MAY X REQ PHY/QHP: CPT | Performed by: INTERNAL MEDICINE

## 2025-03-28 PROCEDURE — 1123F ACP DISCUSS/DSCN MKR DOCD: CPT | Performed by: INTERNAL MEDICINE

## 2025-03-28 PROCEDURE — 3080F DIAST BP >= 90 MM HG: CPT | Performed by: INTERNAL MEDICINE

## 2025-03-28 PROCEDURE — 36415 COLL VENOUS BLD VENIPUNCTURE: CPT

## 2025-03-28 PROCEDURE — 1126F AMNT PAIN NOTED NONE PRSNT: CPT | Performed by: INTERNAL MEDICINE

## 2025-03-28 PROCEDURE — 1159F MED LIST DOCD IN RCRD: CPT | Performed by: INTERNAL MEDICINE

## 2025-03-28 PROCEDURE — 99214 OFFICE O/P EST MOD 30 MIN: CPT | Performed by: INTERNAL MEDICINE

## 2025-03-28 RX ORDER — VITAMIN A 3000 MCG
3 CAPSULE ORAL DAILY
COMMUNITY

## 2025-03-28 RX ORDER — IVERMECTIN 3 MG/1
TABLET ORAL ONCE
COMMUNITY

## 2025-03-28 NOTE — PROGRESS NOTES
Melissa Bo                                                                                                                  3/28/2025  MRN:   [id number]  YOB: 1945  PCP:                           Gagandeep Melgoza DO  Referring Physician: No ref. 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 She is accompanied by her daughter.    No emergency room visits have been required over the past 3 months. A regular diet is maintained, and daily activities are performed, although early fatigue is experienced, necessitating afternoon naps. A weight gain of approximately 14 pounds is noted, and hair regrowth is reported.     In 06/2024, a diagnosis of pericardial effusion was made, but notification was delayed for several months. Currently, she is on a regimen of ivermectin and fenbendazole as part of the [facility] cancer care protocol, with fenbendazole administered 3 days a week and ivermectin daily. Additionally, turmeric, CBD oil, vitamin E, curcumin, and other vitamins are used to supplement her diet.    During this visit patient's allergy, social, medical, surgical history and medications were reviewed and updated.    Summary of Case/History:  Melissa  13

## 2025-03-28 NOTE — TELEPHONE ENCOUNTER
Instructions   from Dr. Sergio Brown MD    Rv in 3 months with labs 2 days before rv     Labs ordered today    Complete this on or around 3/28/2025.  CBC with Auto Differential  Complete this on or around 3/28/2025.  Comprehensive Metabolic Panel  Complete this on or around 3/28/2025.    Next appointment scheduled 6/18/2025 at 11:30am.

## 2025-04-03 ENCOUNTER — TELEPHONE (OUTPATIENT)
Dept: ONCOLOGY | Age: 80
End: 2025-04-03

## 2025-04-03 NOTE — TELEPHONE ENCOUNTER
[facility] Oncology Nutrition Note:  Chart reviewed. Noted recent f/u with Dr Brown on 3/28/25. Writer called patient to inquire about any current nutrition questions/concerns. Encouraged patient to call back if needed; contact information provided. Oncology RD will follow as needed.    [phone]

## 2025-04-09 ENCOUNTER — TELEPHONE (OUTPATIENT)
Dept: ONCOLOGY | Age: 80
End: 2025-04-09

## 2025-04-09 NOTE — TELEPHONE ENCOUNTER
Name: Melissa Bo  : 1945  MRN: [id number]    Oncology Navigation Follow-Up Note    Contact Type:  Telephone    Notes: Upon review of chart noted pt completed 3/28 Dr. Brown f/u.  Per Dr. Brown's f/u note pt anthony not want to resume systemic tx & will f/u in 3 months.  Noted pt taking ivermectin & fenbendazole.  Spoke w/pt for f/u call.  Pt denied questions/concerns.  Notified navigation ended & encouraged to contact writer prn.        Electronically signed by Kim Rainey RN on 2025 at 1:29 PM

## 2025-06-18 ENCOUNTER — OFFICE VISIT (OUTPATIENT)
Age: 80
End: 2025-06-18
Payer: MEDICARE

## 2025-06-18 ENCOUNTER — TELEPHONE (OUTPATIENT)
Age: 80
End: 2025-06-18

## 2025-06-18 ENCOUNTER — HOSPITAL ENCOUNTER (OUTPATIENT)
Age: 80
Discharge: HOME | End: 2025-06-18
Payer: MEDICARE

## 2025-06-18 VITALS
DIASTOLIC BLOOD PRESSURE: 76 MMHG | OXYGEN SATURATION: 97 % | RESPIRATION RATE: 18 BRPM | WEIGHT: 142.6 LBS | SYSTOLIC BLOOD PRESSURE: 120 MMHG | BODY MASS INDEX: 23.02 KG/M2 | TEMPERATURE: 97.2 F | HEART RATE: 61 BPM

## 2025-06-18 DIAGNOSIS — Z51.11 CHEMOTHERAPY MANAGEMENT, ENCOUNTER FOR: ICD-10-CM

## 2025-06-18 DIAGNOSIS — C57.9 MALIGNANT NEOPLASM OF FEMALE GENITAL ORGAN (HCC): ICD-10-CM

## 2025-06-18 DIAGNOSIS — R97.1 ELEVATED CANCER ANTIGEN 125 (CA 125): ICD-10-CM

## 2025-06-18 DIAGNOSIS — C52 VAGINAL CANCER (HCC): ICD-10-CM

## 2025-06-18 DIAGNOSIS — C56.1 MALIGNANT NEOPLASM OF RIGHT OVARY (HCC): Primary | ICD-10-CM

## 2025-06-18 LAB
ALBUMIN SERPL-MCNC: 3.8 G/DL (ref 3.5–5.2)
ALBUMIN/GLOB SERPL: 1.7 {RATIO} (ref 1–2.5)
ALP SERPL-CCNC: 124 U/L (ref 35–104)
ALT SERPL-CCNC: 10 U/L (ref 10–35)
ANION GAP SERPL CALCULATED.3IONS-SCNC: 11 MMOL/L (ref 9–16)
AST SERPL-CCNC: 20 U/L (ref 10–35)
BASOPHILS # BLD: 0.1 K/UL (ref 0–0.2)
BASOPHILS NFR BLD: 1 % (ref 0–2)
BILIRUB SERPL-MCNC: 0.6 MG/DL (ref 0–1.2)
BUN SERPL-MCNC: 15 MG/DL (ref 8–23)
CALCIUM SERPL-MCNC: 8.7 MG/DL (ref 8.6–10.4)
CANCER AG125 SERPL-ACNC: 523 U/ML (ref 0–38)
CHLORIDE SERPL-SCNC: 106 MMOL/L (ref 98–107)
CO2 SERPL-SCNC: 25 MMOL/L (ref 20–31)
CREAT SERPL-MCNC: 0.9 MG/DL (ref 0.6–0.9)
EOSINOPHIL # BLD: 0.1 K/UL (ref 0–0.4)
EOSINOPHILS RELATIVE PERCENT: 1 % (ref 1–4)
ERYTHROCYTE [DISTWIDTH] IN BLOOD BY AUTOMATED COUNT: 12.9 % (ref 12.5–15.4)
GFR, ESTIMATED: 64 ML/MIN/1.73M2
GLUCOSE SERPL-MCNC: 105 MG/DL (ref 74–99)
HCT VFR BLD AUTO: 41.8 % (ref 36–46)
HGB BLD-MCNC: 13.6 G/DL (ref 12–16)
LYMPHOCYTES NFR BLD: 1.3 K/UL (ref 1–4.8)
LYMPHOCYTES RELATIVE PERCENT: 14 % (ref 24–44)
MCH RBC QN AUTO: 31.1 PG (ref 26–34)
MCHC RBC AUTO-ENTMCNC: 32.6 G/DL (ref 31–37)
MCV RBC AUTO: 95.6 FL (ref 80–100)
MONOCYTES NFR BLD: 0.6 K/UL (ref 0.1–1.2)
MONOCYTES NFR BLD: 7 % (ref 2–11)
NEUTROPHILS NFR BLD: 77 % (ref 36–66)
NEUTS SEG NFR BLD: 7.3 K/UL (ref 1.8–7.7)
PLATELET # BLD AUTO: 293 K/UL (ref 140–450)
PMV BLD AUTO: 10.9 FL (ref 6–12)
POTASSIUM SERPL-SCNC: 4.1 MMOL/L (ref 3.7–5.3)
PROT SERPL-MCNC: 6.1 G/DL (ref 6.6–8.7)
RBC # BLD AUTO: 4.37 M/UL (ref 4–5.2)
SODIUM SERPL-SCNC: 142 MMOL/L (ref 136–145)
WBC OTHER # BLD: 9.4 K/UL (ref 3.5–11)

## 2025-06-18 PROCEDURE — 80053 COMPREHEN METABOLIC PANEL: CPT

## 2025-06-18 PROCEDURE — 86304 IMMUNOASSAY TUMOR CA 125: CPT

## 2025-06-18 PROCEDURE — 99214 OFFICE O/P EST MOD 30 MIN: CPT | Performed by: INTERNAL MEDICINE

## 2025-06-18 PROCEDURE — 85025 COMPLETE CBC W/AUTO DIFF WBC: CPT

## 2025-06-18 PROCEDURE — 3074F SYST BP LT 130 MM HG: CPT | Performed by: INTERNAL MEDICINE

## 2025-06-18 PROCEDURE — 3078F DIAST BP <80 MM HG: CPT | Performed by: INTERNAL MEDICINE

## 2025-06-18 PROCEDURE — 36415 COLL VENOUS BLD VENIPUNCTURE: CPT

## 2025-06-18 PROCEDURE — 1126F AMNT PAIN NOTED NONE PRSNT: CPT | Performed by: INTERNAL MEDICINE

## 2025-06-18 PROCEDURE — 1159F MED LIST DOCD IN RCRD: CPT | Performed by: INTERNAL MEDICINE

## 2025-06-18 PROCEDURE — 1123F ACP DISCUSS/DSCN MKR DOCD: CPT | Performed by: INTERNAL MEDICINE

## 2025-06-18 NOTE — TELEPHONE ENCOUNTER
Instructions   from Dr. Sergio Brown MD    Refer to gyn onc at [facility] in 3 months           Faxed referral to [facility] on 9/19/25 @ 11:30 am

## 2025-06-18 NOTE — PROGRESS NOTES
Melissa Bo                                                                                                                  6/18/2025  MRN:   [id number]  YOB: 1945  PCP:                           Gagandeep Melgoza DO  Referring Physician: No ref. 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  Patient daughter is on the phone at the time of the visit.  Denies abdominal pain.  Complains of vaginal discharge.  Patient did not follow-up with gynecological oncology but had a pelvic exam done at [facility].  We currently do not the records.  Will obtain records.  Denies abdominal pain.  Weight is stable.  Patient continues to have holistic approach with turmeric and ivermectin.    During this visit patient's allergy, social, medical, surgical history and medications were reviewed and updated.    Summary of Case/History:  Melissa Bo a 79 y.o.female who presents to the clinic to establish care for further workup and evaluation for recently diagnosed gynecological cancer likely right ovary primary with peritoneal metastasis.  Patient presents to the office accompanied by her  and daughter.